# Patient Record
Sex: FEMALE | Race: WHITE | NOT HISPANIC OR LATINO | Employment: OTHER | URBAN - NONMETROPOLITAN AREA
[De-identification: names, ages, dates, MRNs, and addresses within clinical notes are randomized per-mention and may not be internally consistent; named-entity substitution may affect disease eponyms.]

---

## 2020-03-26 ENCOUNTER — OFFICE VISIT (OUTPATIENT)
Dept: ENDOCRINOLOGY | Facility: CLINIC | Age: 70
End: 2020-03-26

## 2020-03-26 VITALS
WEIGHT: 192.5 LBS | SYSTOLIC BLOOD PRESSURE: 140 MMHG | HEIGHT: 66 IN | OXYGEN SATURATION: 98 % | BODY MASS INDEX: 30.94 KG/M2 | DIASTOLIC BLOOD PRESSURE: 75 MMHG | HEART RATE: 91 BPM

## 2020-03-26 DIAGNOSIS — E78.49 OTHER HYPERLIPIDEMIA: ICD-10-CM

## 2020-03-26 DIAGNOSIS — Z79.4 TYPE 2 DIABETES MELLITUS WITH HYPERGLYCEMIA, WITH LONG-TERM CURRENT USE OF INSULIN (HCC): Primary | ICD-10-CM

## 2020-03-26 DIAGNOSIS — I10 ESSENTIAL HYPERTENSION: ICD-10-CM

## 2020-03-26 DIAGNOSIS — E11.65 TYPE 2 DIABETES MELLITUS WITH HYPERGLYCEMIA, WITH LONG-TERM CURRENT USE OF INSULIN (HCC): Primary | ICD-10-CM

## 2020-03-26 LAB — HBA1C MFR BLD: 8.8 %

## 2020-03-26 PROCEDURE — 99204 OFFICE O/P NEW MOD 45 MIN: CPT | Performed by: NURSE PRACTITIONER

## 2020-03-26 PROCEDURE — 95250 CONT GLUC MNTR PHYS/QHP EQP: CPT | Performed by: NURSE PRACTITIONER

## 2020-03-26 RX ORDER — HYDROCODONE BITARTRATE AND ACETAMINOPHEN 7.5; 325 MG/1; MG/1
1 TABLET ORAL EVERY 6 HOURS PRN
COMMUNITY

## 2020-03-26 RX ORDER — LISINOPRIL 20 MG/1
20 TABLET ORAL DAILY
Status: ON HOLD | COMMUNITY
End: 2023-02-10

## 2020-03-26 RX ORDER — GABAPENTIN 600 MG/1
1800 TABLET ORAL NIGHTLY
COMMUNITY

## 2020-03-26 RX ORDER — AMLODIPINE BESYLATE 2.5 MG/1
2.5 TABLET ORAL DAILY
COMMUNITY
End: 2020-07-09

## 2020-03-26 RX ORDER — RANITIDINE 300 MG/1
150 TABLET ORAL 2 TIMES DAILY
COMMUNITY
End: 2020-07-09

## 2020-03-26 NOTE — PROGRESS NOTES
Lizbeth Hurd is a 72 y.o.. female seen by diabetes educator on 03/26/2020 for review of medications and carbohydrate counting education.     1. Carbohydrate Counting/ Exchange Choices and Healthy Foods   I. Reviewed carbohydrate-containing foods, standard serving sizes, how to measure foods, and reading nutrition labels.   II. Provided patient with carbohydrate counting booklet (Carb counting and meal planning book).   III. Provided patient with list of non-starchy vegetables and foods that are low in carbohydrate for snacks and to incorporate with meals.     IV. Instructed patient to eat 45 grams of carbohydrate with each meal (3 exchange choices) and 15 grams of carbohydrate for snacks (1 exchange choices).    V. Reviewed the difference between simple and complex carbohydrate. Encouraged patient to choose complex carbohydrates more often.     VI. Choose fruits, vegetables, whole grains, legumes, low-fat milk, fiber-rich foods, minimal saturated fats, and watch cholesterol and sodium intake.    VII. Patient demonstrated understanding by correctly identifying and calculating carbohydrate amounts for various meals.    2. Humalog Dosing   I. 3 units of Humalog for every 15 grams of carbohydrate eaten plus 2 units per 50 mg/dl above 150 sliding scale   II. Patient demonstrated understanding by calculating correct dosage for example meals and blood sugars    3. Toujeo   I. 45 units daily, same time of day.     Follow up per JACOB Brannon.  Vonnie Blackwood, BSN, RN, [facility]  Diabetes Educator

## 2020-03-26 NOTE — PATIENT INSTRUCTIONS
oujeo 70  units once at night  -- decrease to 45 units       If am sugar is less than 80 decrease by 5 units         Start Humalog as carb counting       3 units for every 15 grams of CHO       Plus sliding scale       2 per 50 above 150       Aim for 45 grams of carbohydrate per meal     Aim for 15 grams of carbohydrate per snack     Download calorie ki for your phone

## 2020-03-26 NOTE — PROGRESS NOTES
Subjective    HPI       Referring provider Ventura Emanuel MD     72 year old female presents consultation     REASON - Diabetes Mellitus           Duration/Timing - diagnosed at least 12 years ago / constant       Quality - not controlled       Severity -  Moderate       Severity (Complication/Hospitalizations)          Secondary Macrovascular -- no CAD, no CVA, no PAD         Secondary Microvascular -- neuropathy, no diabetic retinopathy, no renal disease     History of left renal cancer        Context --  Presented for increased thirst, urination and labs revealed diabetes           Diabetes Regimen - oral medications, insulin         Lab Results   Component Value Date    HGBA1C 8.8 12/19/2019           Blood Glucose Readings    Checking 3 times daily       Running around 200     Diet-          Regular diet     Drinking chocolate milk      Associated Signs/Symptoms       Hyperglycemic Symptoms: increased thirst        Hypoglycemic Episodes: none      Review of Systems  Review of Systems   Constitutional: Negative for activity change, appetite change, chills, diaphoresis, fatigue, unexpected weight gain and unexpected weight loss.   HENT: Negative for congestion, dental problem, drooling, ear discharge, sore throat, swollen glands, tinnitus, trouble swallowing and voice change.    Eyes: Negative for blurred vision, double vision, photophobia, pain, discharge, redness, itching and visual disturbance.   Respiratory: Negative for apnea, cough, choking, chest tightness and shortness of breath.    Cardiovascular: Negative for chest pain, palpitations and leg swelling.   Gastrointestinal: Negative for abdominal distention, abdominal pain, blood in stool, constipation, diarrhea, nausea and vomiting.   Endocrine: Negative for cold intolerance, heat intolerance, polydipsia, polyphagia and polyuria.   Genitourinary: Negative for decreased libido, decreased urine volume, difficulty urinating, dysuria, frequency and urgency.    Musculoskeletal: Negative for arthralgias, back pain, gait problem, joint swelling, myalgias, neck pain, neck stiffness and bursitis.   Skin: Negative for color change, dry skin, pallor, rash and bruise.   Allergic/Immunologic: Negative for environmental allergies, food allergies and immunocompromised state.   Neurological: Negative for dizziness, tremors, syncope, facial asymmetry, weakness, numbness, memory problem and confusion.   Hematological: Negative for adenopathy. Does not bruise/bleed easily.   Psychiatric/Behavioral: Negative for agitation, behavioral problems, decreased concentration, sleep disturbance, suicidal ideas, depressed mood and stress. The patient is not nervous/anxious.        Wt Readings from Last 3 Encounters:   03/26/20 87.3 kg (192 lb 8 oz)     Temp Readings from Last 3 Encounters:   No data found for Temp     BP Readings from Last 3 Encounters:   03/26/20 140/75     Pulse Readings from Last 3 Encounters:   03/26/20 91       Physical Exam  Physical Exam   Constitutional: She is oriented to person, place, and time. She appears well-developed and well-nourished. No distress.   HENT:   Head: Normocephalic and atraumatic.   Right Ear: External ear normal.   Left Ear: External ear normal.   Nose: Nose normal.   Eyes: Pupils are equal, round, and reactive to light. Conjunctivae and EOM are normal.   Neck: Normal range of motion. Neck supple. No tracheal deviation present. No thyromegaly present.   Cardiovascular: Normal rate, regular rhythm and normal heart sounds.   No murmur heard.  Pulmonary/Chest: Effort normal and breath sounds normal. No respiratory distress. She has no wheezes.   Abdominal: Soft. Bowel sounds are normal. There is no tenderness. There is no rebound and no guarding.   Musculoskeletal: Normal range of motion. She exhibits no edema, tenderness or deformity.   Neurological: She is alert and oriented to person, place, and time. No cranial nerve deficit.   Skin: Skin is warm  and dry. No rash noted.   Psychiatric: She has a normal mood and affect. Her behavior is normal. Judgment and thought content normal.         Assessment/Plan     Diagnosis Plan   1. Type 2 diabetes mellitus with hyperglycemia, with long-term current use of insulin (CMS/[facility])     2. Other hyperlipidemia     3. Essential hypertension             Glycemic Management    Diabetes mellitus type 2         Lab Results   Component Value Date    HGBA1C 8.8 12/19/2019       Metformin 850 mg one po BID ---stopped due side effects       amaryl 2 mg BID --- -stopped when starting insulin     Humalog sliding scale -stop             Toujeo 70  units once at night  -- decrease to 45 units       If am sugar is less than 80 decrease by 5 units         Start Humalog as carb counting       3 units for every 15 grams of CHO       Plus sliding scale       2 per 50 above 150       Aim for 45 grams of carbohydrate per meal     Aim for 15 grams of carbohydrate per snack       Uncontrolled diabetes  Hyperglycemia    Insertion of continuous glucose monitor to define patter    The continuous glucose monitor that was inserted is a mesfin glucose monitor    Meet with Vonnie for diabetes education         Lipid Management    Dec. 2019      Total chol - 161   Tg - 162   HDL - 38  LDL - 91     On niacin     Blood Pressure Management        Lisinopril 20 mg daily    norvasc 5 mg daily               Microvascular Complication Monitoring    Last eye exam Sept 2019 , no DR     Neuropathy     Taking gapapentin       Foot exam  Next appt     Bone [employer]      .last        Other Diabetes Related Aspects          Thyroid Health        No results found for: TSH        Return in about 2 weeks (around 4/9/2020) for Recheck.       Records from  received and reviewed from [id number]  Thank you for this consultation             This document has been electronically signed by JACOB Shaw on March 26, 2020 14:33

## 2020-04-09 ENCOUNTER — OFFICE VISIT (OUTPATIENT)
Dept: ENDOCRINOLOGY | Facility: CLINIC | Age: 70
End: 2020-04-09

## 2020-04-09 DIAGNOSIS — Z79.4 TYPE 2 DIABETES MELLITUS WITH HYPERGLYCEMIA, WITH LONG-TERM CURRENT USE OF INSULIN (HCC): Primary | ICD-10-CM

## 2020-04-09 DIAGNOSIS — E11.65 TYPE 2 DIABETES MELLITUS WITH HYPERGLYCEMIA, WITH LONG-TERM CURRENT USE OF INSULIN (HCC): Primary | ICD-10-CM

## 2020-04-09 DIAGNOSIS — E78.49 OTHER HYPERLIPIDEMIA: ICD-10-CM

## 2020-04-09 DIAGNOSIS — I10 ESSENTIAL HYPERTENSION: ICD-10-CM

## 2020-04-09 PROCEDURE — 99214 OFFICE O/P EST MOD 30 MIN: CPT | Performed by: NURSE PRACTITIONER

## 2020-04-09 NOTE — PROGRESS NOTES
Macy Hurd is a 69 y.o. female. she is here for follow up     History of Present Illness         This is a telephone visit due to pandemic and patient is following the CDC guidelines for social distancing     Referring provider Ventura Emanuel MD      72 year old female presents for follow up      REASON - Diabetes Mellitus               Duration/Timing - diagnosed at least 12 years ago / constant         Quality  Improving control         Severity -  Moderate         Severity (Complication/Hospitalizations)           Secondary Macrovascular -- no CAD, no CVA, no PAD         Secondary Microvascular -- neuropathy, no diabetic retinopathy, no renal disease      History of left renal cancer          Context --  Presented for increased thirst, urination and labs revealed diabetes             Diabetes Regimen - oral medications, insulin                  Lab Results   Component Value Date     HGBA1C 8.8 12/19/2019               Blood Glucose Readings     Checking 3 times daily      Now running 135 and less     Lunch time 160 up to 180      No lows      Diet-            Regular diet      Drinking chocolate milk        Associated Signs/Symptoms       Hyperglycemic Symptoms: increased thirst        Hypoglycemic Episodes: none               The following portions of the patient's history were reviewed and updated as appropriate:   Past Medical History:   Diagnosis Date   • Diabetes (CMS/HCC)      Past Surgical History:   Procedure Laterality Date   • APPENDECTOMY     • BACK SURGERY     • CHOLECYSTECTOMY     • HYSTERECTOMY     • TONSILLECTOMY       Family History   Problem Relation Age of Onset   • Diabetes Other    • Cancer Other      OB History    None       Current Outpatient Medications   Medication Sig Dispense Refill   • amLODIPine (NORVASC) 2.5 MG tablet Take 2.5 mg by mouth Daily.     • gabapentin (NEURONTIN) 600 MG tablet Take 3,600 mg by mouth Every Night.     • HYDROcodone-acetaminophen (NORCO)  7.5-325 MG per tablet Take 1 tablet by mouth Every 6 (Six) Hours As Needed for Moderate Pain .     • Insulin Glargine, 1 Unit Dial, (TOUJEO) 300 UNIT/ML solution pen-injector injection Inject 70 Units under the skin into the appropriate area as directed Every Night.     • insulin lispro (humaLOG) 100 UNIT/ML injection Inject  under the skin into the appropriate area as directed 3 (Three) Times a Day Before Meals.     • lisinopril (PRINIVIL,ZESTRIL) 20 MG tablet Take 20 mg by mouth Daily.     • raNITIdine (ZANTAC) 300 MG tablet Take 150 mg by mouth 2 (Two) Times a Day.       No current facility-administered medications for this visit.      Allergies not on file  Social History     Socioeconomic History   • Marital status:      Spouse name: Not on file   • Number of children: Not on file   • Years of education: Not on file   • Highest education level: Not on file   Tobacco Use   • Smoking status: Never Smoker   • Smokeless tobacco: Never Used       Review of Systems  Review of Systems   Constitutional: Negative for activity change, appetite change, diaphoresis and fatigue.   HENT: Negative for facial swelling, sneezing, sore throat, tinnitus, trouble swallowing and voice change.    Eyes: Negative for photophobia, pain, discharge, redness, itching and visual disturbance.   Respiratory: Negative for apnea, cough, choking, chest tightness and shortness of breath.    Cardiovascular: Negative for chest pain, palpitations and leg swelling.   Gastrointestinal: Negative for abdominal distention, abdominal pain, constipation, diarrhea, nausea and vomiting.   Endocrine: Negative for cold intolerance, heat intolerance, polydipsia, polyphagia and polyuria.   Genitourinary: Negative for difficulty urinating, dysuria, frequency, hematuria and urgency.   Musculoskeletal: Negative for arthralgias, back pain, gait problem, joint swelling, myalgias, neck pain and neck stiffness.   Skin: Negative for color change, pallor, rash  and wound.   Neurological: Negative for dizziness, tremors, weakness, light-headedness, numbness and headaches.   Hematological: Negative for adenopathy. Does not bruise/bleed easily.   Psychiatric/Behavioral: Negative for behavioral problems, confusion and sleep disturbance.        Objective    There were no vitals taken for this visit.  Physical Exam   Constitutional: She is oriented to person, place, and time.   Neurological: She is alert and oriented to person, place, and time.   Psychiatric: She has a normal mood and affect. Her behavior is normal. Judgment and thought content normal.       Lab Review  Hemoglobin A1C (no units)   Date Value   12/19/2019 8.8       Assessment/Plan      1. Type 2 diabetes mellitus with hyperglycemia, with long-term current use of insulin (CMS/[facility])    2. Other hyperlipidemia    3. Essential hypertension    .    Medications prescribed:  Outpatient Encounter Medications as of 4/9/2020   Medication Sig Dispense Refill   • amLODIPine (NORVASC) 2.5 MG tablet Take 2.5 mg by mouth Daily.     • gabapentin (NEURONTIN) 600 MG tablet Take 3,600 mg by mouth Every Night.     • HYDROcodone-acetaminophen (NORCO) 7.5-325 MG per tablet Take 1 tablet by mouth Every 6 (Six) Hours As Needed for Moderate Pain .     • Insulin Glargine, 1 Unit Dial, (TOUJEO) 300 UNIT/ML solution pen-injector injection Inject 70 Units under the skin into the appropriate area as directed Every Night.     • insulin lispro (humaLOG) 100 UNIT/ML injection Inject  under the skin into the appropriate area as directed 3 (Three) Times a Day Before Meals.     • lisinopril (PRINIVIL,ZESTRIL) 20 MG tablet Take 20 mg by mouth Daily.     • raNITIdine (ZANTAC) 300 MG tablet Take 150 mg by mouth 2 (Two) Times a Day.       No facility-administered encounter medications on file as of 4/9/2020.        Orders placed during this encounter include:  No orders of the defined types were placed in this encounter.    Glycemic Management      Diabetes mellitus type 2                  Lab Results   Component Value Date     HGBA1C 8.8 12/19/2019         Metformin 850 mg one po BID ---stopped due side effects         amaryl 2 mg BID --- -stopped when starting insulin      Humalog sliding scale -stop             Current regimen :          Toujeo  45 units         If am sugar is less than 80 decrease by 5 units             Humalog as carb counting         3 units for every 15 grams of CHO         Plus sliding scale         2 per 50 above 150         Aim for 45 grams of carbohydrate per meal      Aim for 15 grams of carbohydrate per snack          she will mail the mesfin back to the office             Lipid Management     Dec. 2019        Total chol - 161   Tg - 162   HDL - 38  LDL - 91      On niacin      Blood Pressure Management           Lisinopril 20 mg daily     norvasc 5 mg daily                     Microvascular Complication Monitoring     Last eye exam Sept 2019 , no DR      Neuropathy      Taking gapapentin         Foot exam  Next appt      Bone [employer]        .last           Other Diabetes Related Aspects              Thyroid Health         Weight management    Loss 4 lbs since last visit , counting carbs         Total time for telehealth visit 22 minutes of phone conversation  ( there was also time for preparation, ordering labs, medications, etc.)  Patient instructed to call for problems with medications, blood sugars         4. Follow-up: Return in about 3 months (around 7/9/2020) for Recheck.

## 2020-04-15 NOTE — PROGRESS NOTES
.You have chosen to receive care through a telephone visit. Do you consent to use a telephone visit for your medical care today? Yes

## 2020-07-09 ENCOUNTER — OFFICE VISIT (OUTPATIENT)
Dept: ENDOCRINOLOGY | Facility: CLINIC | Age: 70
End: 2020-07-09

## 2020-07-09 VITALS
DIASTOLIC BLOOD PRESSURE: 80 MMHG | HEART RATE: 95 BPM | HEIGHT: 66 IN | WEIGHT: 190.2 LBS | SYSTOLIC BLOOD PRESSURE: 166 MMHG | OXYGEN SATURATION: 98 % | BODY MASS INDEX: 30.57 KG/M2

## 2020-07-09 DIAGNOSIS — I10 ESSENTIAL HYPERTENSION: ICD-10-CM

## 2020-07-09 DIAGNOSIS — Z79.4 TYPE 2 DIABETES MELLITUS WITH HYPERGLYCEMIA, WITH LONG-TERM CURRENT USE OF INSULIN (HCC): Primary | ICD-10-CM

## 2020-07-09 DIAGNOSIS — E11.65 TYPE 2 DIABETES MELLITUS WITH HYPERGLYCEMIA, WITH LONG-TERM CURRENT USE OF INSULIN (HCC): Primary | ICD-10-CM

## 2020-07-09 DIAGNOSIS — E78.49 OTHER HYPERLIPIDEMIA: ICD-10-CM

## 2020-07-09 LAB — HBA1C MFR BLD: 5.6 %

## 2020-07-09 PROCEDURE — 99214 OFFICE O/P EST MOD 30 MIN: CPT | Performed by: NURSE PRACTITIONER

## 2020-07-09 RX ORDER — CYCLOBENZAPRINE HCL 5 MG
5 TABLET ORAL 2 TIMES DAILY PRN
COMMUNITY
Start: 2020-07-02

## 2020-07-09 RX ORDER — FAMOTIDINE 20 MG/1
TABLET, FILM COATED ORAL
Status: ON HOLD | COMMUNITY
Start: 2020-07-02 | End: 2023-02-10

## 2020-07-09 RX ORDER — FLURBIPROFEN SODIUM 0.3 MG/ML
SOLUTION/ DROPS OPHTHALMIC
COMMUNITY
Start: 2020-07-01 | End: 2021-06-11 | Stop reason: SDUPTHER

## 2020-07-09 RX ORDER — AMLODIPINE BESYLATE 5 MG/1
TABLET ORAL
COMMUNITY
Start: 2020-07-02 | End: 2022-04-14

## 2020-07-09 NOTE — PROGRESS NOTES
Subjective    Lizbeth Hurd is a 70 y.o. female. she is here today for follow-up.    History of Present Illness     IN OFFICE VISIT     Referring provider Ventura Emanuel MD      70 year old female presents for follow up      REASON - Diabetes Mellitus               Duration/Timing - diagnosed at least 12 years ago / constant         Quality  Improving control         Severity -  Moderate         Severity (Complication/Hospitalizations)           Secondary Macrovascular -- no CAD, no CVA, no PAD         Secondary Microvascular -- neuropathy, no diabetic retinopathy, no renal disease      History of left renal cancer          Context --  Presented for increased thirst, urination and labs revealed diabetes             Diabetes Regimen - oral medications, insulin                 Lab Results   Component Value Date    HGBA1C 5.6 06/15/2020             Blood Glucose Readings     Checking 3 times daily      This am was 107    Am runs from 90 up to 170      No lows      Diet-            Regular diet      Drinking chocolate milk        Associated Signs/Symptoms       Hyperglycemic Symptoms: increased thirst        Hypoglycemic Episodes: none                    The following portions of the patient's history were reviewed and updated as appropriate:   Past Medical History:   Diagnosis Date   • Diabetes (CMS/HCC)      Past Surgical History:   Procedure Laterality Date   • APPENDECTOMY     • BACK SURGERY     • CHOLECYSTECTOMY     • HYSTERECTOMY     • TONSILLECTOMY       Family History   Problem Relation Age of Onset   • Diabetes Other    • Cancer Other      OB History    None       Current Outpatient Medications   Medication Sig Dispense Refill   • amLODIPine (NORVASC) 2.5 MG tablet Take 2.5 mg by mouth Daily.     • amLODIPine (NORVASC) 5 MG tablet      • B-D UF III MINI PEN NEEDLES 31G X 5 MM misc      • cyclobenzaprine (FLEXERIL) 5 MG tablet      • famotidine (PEPCID) 20 MG tablet      • gabapentin (NEURONTIN) 600 MG tablet  Take 3,600 mg by mouth Every Night.     • HYDROcodone-acetaminophen (NORCO) 7.5-325 MG per tablet Take 1 tablet by mouth Every 6 (Six) Hours As Needed for Moderate Pain .     • Insulin Glargine, 1 Unit Dial, (TOUJEO) 300 UNIT/ML solution pen-injector injection Inject 70 Units under the skin into the appropriate area as directed Every Night.     • insulin lispro (humaLOG) 100 UNIT/ML injection Inject  under the skin into the appropriate area as directed 3 (Three) Times a Day Before Meals.     • lisinopril (PRINIVIL,ZESTRIL) 20 MG tablet Take 20 mg by mouth Daily.     • raNITIdine (ZANTAC) 300 MG tablet Take 150 mg by mouth 2 (Two) Times a Day.       No current facility-administered medications for this visit.      No Known Allergies  Social History     Socioeconomic History   • Marital status:      Spouse name: Not on file   • Number of children: Not on file   • Years of education: Not on file   • Highest education level: Not on file   Tobacco Use   • Smoking status: Never Smoker   • Smokeless tobacco: Never Used       Review of Systems  Review of Systems   Constitutional: Negative for activity change, appetite change, diaphoresis and fatigue.   HENT: Negative for facial swelling, sneezing, sore throat, tinnitus, trouble swallowing and voice change.    Eyes: Negative for photophobia, pain, discharge, redness, itching and visual disturbance.   Respiratory: Negative for apnea, cough, choking, chest tightness and shortness of breath.    Cardiovascular: Negative for chest pain, palpitations and leg swelling.   Gastrointestinal: Negative for abdominal distention, abdominal pain, constipation, diarrhea, nausea and vomiting.   Endocrine: Negative for cold intolerance, heat intolerance, polydipsia, polyphagia and polyuria.   Genitourinary: Negative for difficulty urinating, dysuria, frequency, hematuria and urgency.   Musculoskeletal: Negative for arthralgias, back pain, gait problem, joint swelling, myalgias, neck  "pain and neck stiffness.   Skin: Negative for color change, pallor, rash and wound.   Neurological: Negative for dizziness, tremors, weakness, light-headedness, numbness and headaches.   Hematological: Negative for adenopathy. Does not bruise/bleed easily.   Psychiatric/Behavioral: Negative for behavioral problems, confusion and sleep disturbance.        Objective    /80   Pulse 95   Ht 167.6 cm (66\")   Wt 86.3 kg (190 lb 3.2 oz)   SpO2 98%   BMI 30.70 kg/m²   Physical Exam   Constitutional: She is oriented to person, place, and time. She appears well-developed and well-nourished. No distress.   HENT:   Head: Normocephalic and atraumatic.   Right Ear: External ear normal.   Left Ear: External ear normal.   Nose: Nose normal.   Eyes: Pupils are equal, round, and reactive to light. Conjunctivae and EOM are normal.   Neck: Normal range of motion. Neck supple. No tracheal deviation present. No thyromegaly present.   Cardiovascular: Normal rate, regular rhythm and normal heart sounds.   No murmur heard.  Pulmonary/Chest: Effort normal and breath sounds normal. No respiratory distress. She has no wheezes.   Abdominal: Soft. Bowel sounds are normal. There is no tenderness. There is no rebound and no guarding.   Musculoskeletal: Normal range of motion. She exhibits no edema, tenderness or deformity.    Lizbeth had a diabetic foot exam performed today.   During the foot exam she had a monofilament test performed.    Neurological Sensory Findings -  No right ankle/foot altered proprioception and no left ankle/foot altered proprioception  Vascular Status -  Her right foot exhibits no edema. Her left foot exhibits no edema.  Skin Integrity  -  She has right heel is dry and cracked.She has left heel dry and cracked..  Neurological: She is alert and oriented to person, place, and time. No cranial nerve deficit.   Skin: Skin is warm and dry. No rash noted.   Psychiatric: She has a normal mood and affect. Her behavior is " normal. Judgment and thought content normal.       Lab Review  Hemoglobin A1C (no units)   Date Value   06/15/2020 5.6   12/19/2019 8.8       Assessment/Plan      1. Type 2 diabetes mellitus with hyperglycemia, with long-term current use of insulin (CMS/[facility])    2. Other hyperlipidemia    3. Essential hypertension    .    Medications prescribed:  Outpatient Encounter Medications as of 7/9/2020   Medication Sig Dispense Refill   • amLODIPine (NORVASC) 2.5 MG tablet Take 2.5 mg by mouth Daily.     • amLODIPine (NORVASC) 5 MG tablet      • B-D UF III MINI PEN NEEDLES 31G X 5 MM misc      • cyclobenzaprine (FLEXERIL) 5 MG tablet      • famotidine (PEPCID) 20 MG tablet      • gabapentin (NEURONTIN) 600 MG tablet Take 3,600 mg by mouth Every Night.     • HYDROcodone-acetaminophen (NORCO) 7.5-325 MG per tablet Take 1 tablet by mouth Every 6 (Six) Hours As Needed for Moderate Pain .     • Insulin Glargine, 1 Unit Dial, (TOUJEO) 300 UNIT/ML solution pen-injector injection Inject 70 Units under the skin into the appropriate area as directed Every Night.     • insulin lispro (humaLOG) 100 UNIT/ML injection Inject  under the skin into the appropriate area as directed 3 (Three) Times a Day Before Meals.     • lisinopril (PRINIVIL,ZESTRIL) 20 MG tablet Take 20 mg by mouth Daily.     • raNITIdine (ZANTAC) 300 MG tablet Take 150 mg by mouth 2 (Two) Times a Day.       No facility-administered encounter medications on file as of 7/9/2020.        Orders placed during this encounter include:  Orders Placed This Encounter   Procedures   • Hemoglobin A1c     This order was created through External Result Entry   Glycemic Management     Diabetes mellitus type 2         Lab Results   Component Value Date    HGBA1C 5.6 06/15/2020             Metformin 850 mg one po BID ---stopped due side effects         amaryl 2 mg BID --- -stopped when starting insulin      Humalog sliding scale -stop             Current regimen :            Toujeo  45  units         If am sugar is less than 80 decrease by 5 units             Humalog as carb counting         3 units for every 15 grams of CHO for each meal TID         Plus sliding scale         2 per 50 above 150         Aim for 45 grams of carbohydrate per meal      Aim for 15 grams of carbohydrate per snack                    Lipid Management     June 2020     Total chl 145  Tg - 144    HDL - 34  LDL - 81      On niacin      Blood Pressure Management           Lisinopril 20 mg daily     norvasc 5 mg daily                     Microvascular Complication Monitoring     Last eye exam Sept 2019 , no DR      Neuropathy      Taking gapapentin         Foot exam  done today      Bone Health        .last           Other Diabetes Related Aspects              Thyroid Health         Weight management     Patient's Body mass index is 30.7 kg/m². BMI is above normal parameters. Recommendations include: nutrition counseling.            4. Follow-up: Return in about 3 months (around 10/9/2020) for Recheck.

## 2020-10-12 ENCOUNTER — OFFICE VISIT (OUTPATIENT)
Dept: ENDOCRINOLOGY | Facility: CLINIC | Age: 70
End: 2020-10-12

## 2020-10-12 VITALS
SYSTOLIC BLOOD PRESSURE: 158 MMHG | OXYGEN SATURATION: 97 % | BODY MASS INDEX: 31.64 KG/M2 | WEIGHT: 196.9 LBS | HEART RATE: 106 BPM | DIASTOLIC BLOOD PRESSURE: 72 MMHG | HEIGHT: 66 IN

## 2020-10-12 DIAGNOSIS — E11.65 TYPE 2 DIABETES MELLITUS WITH HYPERGLYCEMIA, WITH LONG-TERM CURRENT USE OF INSULIN (HCC): Primary | ICD-10-CM

## 2020-10-12 DIAGNOSIS — Z79.4 TYPE 2 DIABETES MELLITUS WITH HYPERGLYCEMIA, WITH LONG-TERM CURRENT USE OF INSULIN (HCC): Primary | ICD-10-CM

## 2020-10-12 DIAGNOSIS — E78.49 OTHER HYPERLIPIDEMIA: ICD-10-CM

## 2020-10-12 DIAGNOSIS — E11.42 DIABETIC POLYNEUROPATHY ASSOCIATED WITH TYPE 2 DIABETES MELLITUS (HCC): ICD-10-CM

## 2020-10-12 DIAGNOSIS — I10 ESSENTIAL HYPERTENSION: ICD-10-CM

## 2020-10-12 LAB — HBA1C MFR BLD: 6 %

## 2020-10-12 PROCEDURE — 99214 OFFICE O/P EST MOD 30 MIN: CPT | Performed by: NURSE PRACTITIONER

## 2020-10-12 NOTE — PROGRESS NOTES
Subjective    Lizbeth Hurd is a 70 y.o. female. she is here today for follow-up.    History of Present Illness         IN OFFICE VISIT      Referring provider Ventura Emanuel MD      70 year old female presents for follow up      REASON - Diabetes Mellitus               Duration/Timing - diagnosed at least 12 years ago / constant         Quality  Improving control         Severity -  Moderate         Severity (Complication/Hospitalizations)           Secondary Macrovascular -- no CAD, no CVA, no PAD         Secondary Microvascular -- neuropathy, no diabetic retinopathy, no renal disease      History of left renal cancer          Context --  Presented for increased thirst, urination and labs revealed diabetes             Diabetes Regimen - oral medications, insulin               Lab Results   Component Value Date    HGBA1C 6 09/16/2020             Blood Glucose Readings     Checking 3 times daily      This am was 107     Am runs from 90 up to 140     No lows      Diet-            Regular diet      Drinking chocolate milk        Associated Signs/Symptoms       Hyperglycemic Symptoms: increased thirst        Hypoglycemic Episodes: none            The following portions of the patient's history were reviewed and updated as appropriate:   Past Medical History:   Diagnosis Date   • Diabetes (CMS/HCC)      Past Surgical History:   Procedure Laterality Date   • APPENDECTOMY     • BACK SURGERY     • CHOLECYSTECTOMY     • HYSTERECTOMY     • TONSILLECTOMY       Family History   Problem Relation Age of Onset   • Diabetes Other    • Cancer Other      OB History    No obstetric history on file.       Current Outpatient Medications   Medication Sig Dispense Refill   • amLODIPine (NORVASC) 5 MG tablet      • B-D UF III MINI PEN NEEDLES 31G X 5 MM misc      • cyclobenzaprine (FLEXERIL) 5 MG tablet      • famotidine (PEPCID) 20 MG tablet      • gabapentin (NEURONTIN) 600 MG tablet Take 3,600 mg by mouth Every Night.     •  HYDROcodone-acetaminophen (NORCO) 7.5-325 MG per tablet Take 1 tablet by mouth Every 6 (Six) Hours As Needed for Moderate Pain .     • Insulin Glargine, 1 Unit Dial, (TOUJEO) 300 UNIT/ML solution pen-injector injection Inject 70 Units under the skin into the appropriate area as directed Every Night.     • insulin lispro (humaLOG) 100 UNIT/ML injection Inject  under the skin into the appropriate area as directed 3 (Three) Times a Day Before Meals.     • lisinopril (PRINIVIL,ZESTRIL) 20 MG tablet Take 20 mg by mouth Daily.       No current facility-administered medications for this visit.      No Known Allergies  Social History     Socioeconomic History   • Marital status:      Spouse name: Not on file   • Number of children: Not on file   • Years of education: Not on file   • Highest education level: Not on file   Tobacco Use   • Smoking status: Never Smoker   • Smokeless tobacco: Never Used       Review of Systems  Review of Systems   Constitutional: Negative for activity change, appetite change, diaphoresis and fatigue.   HENT: Negative for facial swelling, sneezing, sore throat, tinnitus, trouble swallowing and voice change.    Eyes: Negative for photophobia, pain, discharge, redness, itching and visual disturbance.   Respiratory: Negative for apnea, cough, choking, chest tightness and shortness of breath.    Cardiovascular: Negative for chest pain, palpitations and leg swelling.   Gastrointestinal: Negative for abdominal distention, abdominal pain, constipation, diarrhea, nausea and vomiting.   Endocrine: Negative for cold intolerance, heat intolerance, polydipsia, polyphagia and polyuria.   Genitourinary: Negative for difficulty urinating, dysuria, frequency, hematuria and urgency.   Musculoskeletal: Negative for arthralgias, back pain, gait problem, joint swelling, myalgias, neck pain and neck stiffness.   Skin: Negative for color change, pallor, rash and wound.   Neurological: Negative for dizziness,  "tremors, weakness, light-headedness, numbness and headaches.   Hematological: Negative for adenopathy. Does not bruise/bleed easily.   Psychiatric/Behavioral: Negative for behavioral problems, confusion and sleep disturbance.        Objective    /72   Pulse 106   Ht 167.6 cm (66\")   Wt 89.3 kg (196 lb 14.4 oz)   SpO2 97%   BMI 31.78 kg/m²   Physical Exam  Constitutional:       General: She is not in acute distress.     Appearance: She is well-developed.   HENT:      Head: Normocephalic and atraumatic.      Right Ear: External ear normal.      Left Ear: External ear normal.      Nose: Nose normal.   Eyes:      Conjunctiva/sclera: Conjunctivae normal.      Pupils: Pupils are equal, round, and reactive to light.   Neck:      Musculoskeletal: Normal range of motion and neck supple.      Thyroid: No thyromegaly.      Trachea: No tracheal deviation.   Cardiovascular:      Rate and Rhythm: Normal rate and regular rhythm.      Heart sounds: Normal heart sounds. No murmur.   Pulmonary:      Effort: Pulmonary effort is normal. No respiratory distress.      Breath sounds: Normal breath sounds. No wheezing.   Abdominal:      General: Bowel sounds are normal.      Palpations: Abdomen is soft.      Tenderness: There is no abdominal tenderness. There is no guarding or rebound.   Musculoskeletal: Normal range of motion.         General: No tenderness or deformity.   Skin:     General: Skin is warm and dry.      Findings: No rash.   Neurological:      Mental Status: She is alert and oriented to person, place, and time.      Cranial Nerves: No cranial nerve deficit.   Psychiatric:         Behavior: Behavior normal.         Thought Content: Thought content normal.         Judgment: Judgment normal.         Lab Review  Hemoglobin A1C (no units)   Date Value   09/16/2020 6   06/15/2020 5.6   12/19/2019 8.8       Assessment/Plan      1. Type 2 diabetes mellitus with hyperglycemia, with long-term current use of insulin " (CMS/[facility])    2. Other hyperlipidemia    3. Essential hypertension    .    Medications prescribed:  Outpatient Encounter Medications as of 10/12/2020   Medication Sig Dispense Refill   • amLODIPine (NORVASC) 5 MG tablet      • B-D UF III MINI PEN NEEDLES 31G X 5 MM misc      • cyclobenzaprine (FLEXERIL) 5 MG tablet      • famotidine (PEPCID) 20 MG tablet      • gabapentin (NEURONTIN) 600 MG tablet Take 3,600 mg by mouth Every Night.     • HYDROcodone-acetaminophen (NORCO) 7.5-325 MG per tablet Take 1 tablet by mouth Every 6 (Six) Hours As Needed for Moderate Pain .     • Insulin Glargine, 1 Unit Dial, (TOUJEO) 300 UNIT/ML solution pen-injector injection Inject 70 Units under the skin into the appropriate area as directed Every Night.     • insulin lispro (humaLOG) 100 UNIT/ML injection Inject  under the skin into the appropriate area as directed 3 (Three) Times a Day Before Meals.     • lisinopril (PRINIVIL,ZESTRIL) 20 MG tablet Take 20 mg by mouth Daily.       No facility-administered encounter medications on file as of 10/12/2020.        Orders placed during this encounter include:  Orders Placed This Encounter   Procedures   • Hemoglobin A1c     This order was created through External Result Entry     Glycemic Management     Diabetes mellitus type 2         Lab Results   Component Value Date    HGBA1C 6 09/16/2020             Metformin 850 mg one po BID ---stopped due side effects         amaryl 2 mg BID --- -stopped when starting insulin      Humalog sliding scale -stop             Current regimen :            Toujeo  45 units         If am sugar is less than 80 decrease by 5 units             Humalog as carb counting         3 units for every 15 grams of CHO for each meal TID         Plus sliding scale         2 per 50 above 150         Aim for 45 grams of carbohydrate per meal      Aim for 15 grams of carbohydrate per snack                     Lipid Management     Sept 2020      Total chl 151  Tg - 130     HDL  - 43  LDL - 82     On niacin      Blood Pressure Management           Lisinopril 20 mg daily     norvasc 5 mg daily                     Microvascular Complication Monitoring     Last eye exam Sept 2019 , no DR , postponed due to pandemic      Neuropathy      Taking gapapentin         Foot exam  up to date      Bone Health        .last           Other Diabetes Related Aspects              Thyroid Health         Weight management     Patient's Body mass index is 31.78 kg/m². BMI is above normal parameters. Recommendations include: nutrition counseling.      4. Follow-up: No follow-ups on file.

## 2021-02-12 ENCOUNTER — TELEMEDICINE (OUTPATIENT)
Dept: ENDOCRINOLOGY | Facility: CLINIC | Age: 71
End: 2021-02-12

## 2021-02-12 DIAGNOSIS — Z79.4 TYPE 2 DIABETES MELLITUS WITH DIABETIC POLYNEUROPATHY, WITH LONG-TERM CURRENT USE OF INSULIN (HCC): Primary | ICD-10-CM

## 2021-02-12 DIAGNOSIS — E78.2 MIXED HYPERLIPIDEMIA: ICD-10-CM

## 2021-02-12 DIAGNOSIS — I10 ESSENTIAL HYPERTENSION: ICD-10-CM

## 2021-02-12 DIAGNOSIS — E11.42 TYPE 2 DIABETES MELLITUS WITH DIABETIC POLYNEUROPATHY, WITH LONG-TERM CURRENT USE OF INSULIN (HCC): Primary | ICD-10-CM

## 2021-02-12 DIAGNOSIS — E55.9 VITAMIN D DEFICIENCY: ICD-10-CM

## 2021-02-12 LAB — HBA1C MFR BLD: 6.7 %

## 2021-02-12 PROCEDURE — 99214 OFFICE O/P EST MOD 30 MIN: CPT | Performed by: NURSE PRACTITIONER

## 2021-02-12 NOTE — PROGRESS NOTES
Chief Complaint  Diabetes    Subjective          Lizbeth Hurd presents to [facility] ENDOCR[facility]OLOG[facility]  History of Present Illness     You have chosen to receive care through a telehealth visit.  Do you consent to use a video/audio connection for your medical care today? Yes            TELEHEALTH VIDEO VISIT     This a video visit due to [facility] current guidelines for social distancing due to the COVID 19 pandemic          Referring provider Ventura Emanuel MD       70 year old female presents for follow up     Reason diabetes mellitus     Duration diagnosed 2008     Timing constant      Quality near control     Severity moderate                 Severity (Complication/Hospitalizations)           Secondary Macrovascular --none         Secondary Microvascular -- neuropathy     History of left renal cancer          Context --  Presented for increased thirst, urination and labs revealed diabetes             Diabetes Regimen - oral medications, insulin         Lab Results   Component Value Date    HGBA1C 6.7 12/18/2020             Blood Glucose Readings     Checking 3 times daily        Am below 130    States daytime up to 200      Diet-         Regular diet         Associated Signs/Symptoms       Hyperglycemic Symptoms: none        Hypoglycemic Episodes: none                  Objective   Vital Signs:   There were no vitals taken for this visit.    Physical Exam  Neurological:      General: No focal deficit present.      Mental Status: She is alert.   Psychiatric:         Mood and Affect: Mood normal.         Thought Content: Thought content normal.         Judgment: Judgment normal.        Result Review :                 Assessment and Plan    Diagnoses and all orders for this visit:    1. Type 2 diabetes mellitus with diabetic polyneuropathy, with long-term current use of insulin (CMS/[facility]) (Primary)    2. Vitamin D deficiency    3. Essential hypertension    4. Mixed hyperlipidemia           Glycemic  Management     Diabetes mellitus type 2         Lab Results   Component Value Date    HGBA1C 6.7 12/18/2020          Metformin 850 mg one po BID ---stopped due side effects         amaryl 2 mg BID --- -stopped when starting insulin      Humalog sliding scale -stop             Current regimen :            Taking Toujeo  45 units once night         If am sugar is less than 80 decrease by 5 units             Humalog as carb counting         3 units for every 15 grams of CHO for each meal TID         Plus sliding scale         2 per 50 above 150         Aim for 45 grams of carbohydrate per meal      Aim for 15 grams of carbohydrate per snack                     Lipid Management     Sept 2020      Total chl 151  Tg - 130     HDL - 43  LDL - 82     Taking niacin      Blood Pressure Management           Taking Lisinopril 20 mg daily     Taking norvasc 5 mg daily                     Microvascular Complication Monitoring     Last eye exam Sept 2019 , no DR , postponed due to pandemic      Neuropathy      Taking gapapentin              Bone Health        Vitamin d def.    Taking vitamin d supplement            Other Diabetes Related Aspects              Thyroid Health         Weight management     Overweight              Follow Up   No follow-ups on file.  Patient was given instructions and counseling regarding her condition or for health maintenance advice. Please see specific information pulled into the AVS if appropriate.     We spent 25 minutes including reviewing the patients electronic chart, reviewing medications, reviewing labs, active problems    I provided advice regarding diabetes management, dietary changes, adjustments of medication, self titration of insulin, refilled medications, ordering labs, future appointments    Patient was advised to contact the office if there are unanswered questions, trouble with blood glucose management, or any concerns

## 2021-06-11 ENCOUNTER — OFFICE VISIT (OUTPATIENT)
Dept: ENDOCRINOLOGY | Facility: CLINIC | Age: 71
End: 2021-06-11

## 2021-06-11 VITALS
WEIGHT: 197.8 LBS | SYSTOLIC BLOOD PRESSURE: 164 MMHG | OXYGEN SATURATION: 98 % | DIASTOLIC BLOOD PRESSURE: 82 MMHG | HEIGHT: 66 IN | HEART RATE: 96 BPM | BODY MASS INDEX: 31.79 KG/M2

## 2021-06-11 DIAGNOSIS — E11.42 TYPE 2 DIABETES MELLITUS WITH DIABETIC POLYNEUROPATHY, WITH LONG-TERM CURRENT USE OF INSULIN (HCC): Primary | ICD-10-CM

## 2021-06-11 DIAGNOSIS — E78.2 MIXED HYPERLIPIDEMIA: ICD-10-CM

## 2021-06-11 DIAGNOSIS — Z79.4 TYPE 2 DIABETES MELLITUS WITH DIABETIC POLYNEUROPATHY, WITH LONG-TERM CURRENT USE OF INSULIN (HCC): Primary | ICD-10-CM

## 2021-06-11 DIAGNOSIS — I10 ESSENTIAL HYPERTENSION: ICD-10-CM

## 2021-06-11 DIAGNOSIS — E55.9 VITAMIN D DEFICIENCY: ICD-10-CM

## 2021-06-11 LAB — HBA1C MFR BLD: 7.3 %

## 2021-06-11 PROCEDURE — 99214 OFFICE O/P EST MOD 30 MIN: CPT | Performed by: NURSE PRACTITIONER

## 2021-06-11 RX ORDER — LANCETS 30 GAUGE
EACH MISCELLANEOUS
Qty: 120 EACH | Refills: 11 | Status: SHIPPED | OUTPATIENT
Start: 2021-06-11 | End: 2022-06-20

## 2021-06-11 RX ORDER — FLURBIPROFEN SODIUM 0.3 MG/ML
SOLUTION/ DROPS OPHTHALMIC
Qty: 180 EACH | Refills: 11 | Status: SHIPPED | OUTPATIENT
Start: 2021-06-11 | End: 2022-06-20

## 2021-06-11 NOTE — PROGRESS NOTES
"Chief Complaint  Diabetes    Subjective          Lizbeth Hurd presents to [facility] ENDOCRINOLOGY  History of Present Illness      In office visit     Referring provider Ventura Emanuel MD     71 year old female presents for follow up     Reason diabetes mellitus type 2    Timing constant     Duration diagnosed in 2008     Quality near control           Severity moderate        Severity (Complication/Hospitalizations)           Secondary Macrovascular --none         Secondary Microvascular -- neuropathy     History of left renal cancer          Context --  Presented for increased thirst, urination and labs revealed diabetes             Diabetes Regimen - oral medications, insulin         Lab Results   Component Value Date    HGBA1C 7.3 03/17/2021             Blood Glucose Readings     Checking 4 times daily      Am readings 180 and up     Eating ice cream before bed          Diet-         Regular diet         Associated Signs/Symptoms       Hyperglycemic Symptoms: none        Hypoglycemic Episodes: none               Review of Systems - General ROS: negative        Objective   Vital Signs:   /82   Pulse 96   Ht 167.6 cm (66\")   Wt 89.7 kg (197 lb 12.8 oz)   SpO2 98%   BMI 31.93 kg/m²     Physical Exam  Constitutional:       Appearance: Normal appearance.   Cardiovascular:      Rate and Rhythm: Regular rhythm.      Heart sounds: Normal heart sounds.   Pulmonary:      Breath sounds: Normal breath sounds.   Musculoskeletal:         General: Normal range of motion.      Cervical back: Normal range of motion.   Skin:     Coloration: Skin is not pale.   Neurological:      General: No focal deficit present.      Mental Status: She is alert.   Psychiatric:         Mood and Affect: Mood normal.         Thought Content: Thought content normal.         Judgment: Judgment normal.        Result Review :   The following data was reviewed by: JACOB Shaw on 06/11/2021:  Common labs  "   Common Labsle 9/16/20 12/18/20 3/17/21   Hemoglobin A1C 6 6.7 7.3                     Assessment and Plan    Diagnoses and all orders for this visit:    1. Type 2 diabetes mellitus with diabetic polyneuropathy, with long-term current use of insulin (CMS/[facility]) (Primary)  -     CBC & Differential; Future  -     Comprehensive Metabolic Panel; Future  -     Hemoglobin A1c; Future  -     Lipid Panel; Future  -     Microalbumin / Creatinine Urine Ratio - Urine, Clean Catch; Future  -     Protein / Creatinine Ratio, Urine - Urine, Clean Catch; Future  -     TSH; Future  -     Vitamin B12; Future  -     Vitamin D 25 Hydroxy; Future    2. Mixed hyperlipidemia  -     CBC & Differential; Future  -     Comprehensive Metabolic Panel; Future  -     Hemoglobin A1c; Future  -     Lipid Panel; Future  -     Microalbumin / Creatinine Urine Ratio - Urine, Clean Catch; Future  -     Protein / Creatinine Ratio, Urine - Urine, Clean Catch; Future  -     TSH; Future  -     Vitamin B12; Future  -     Vitamin D 25 Hydroxy; Future    3. Essential hypertension  -     CBC & Differential; Future  -     Comprehensive Metabolic Panel; Future  -     Hemoglobin A1c; Future  -     Lipid Panel; Future  -     Microalbumin / Creatinine Urine Ratio - Urine, Clean Catch; Future  -     Protein / Creatinine Ratio, Urine - Urine, Clean Catch; Future  -     TSH; Future  -     Vitamin B12; Future  -     Vitamin D 25 Hydroxy; Future    4. Vitamin D deficiency  -     CBC & Differential; Future  -     Comprehensive Metabolic Panel; Future  -     Hemoglobin A1c; Future  -     Lipid Panel; Future  -     Microalbumin / Creatinine Urine Ratio - Urine, Clean Catch; Future  -     Protein / Creatinine Ratio, Urine - Urine, Clean Catch; Future  -     TSH; Future  -     Vitamin B12; Future  -     Vitamin D 25 Hydroxy; Future    Other orders  -     insulin lispro (humaLOG) 100 UNIT/ML injection; Inject 9 up to 15 units 4 times daily before each meal  Dispense: 100 mL;  Refill: 11  -     B-D UF III MINI PEN NEEDLES 31G X 5 MM misc; Inject 6 times daily,E10.65  Dispense: 180 each; Refill: 11  -     Insulin Glargine, 1 Unit Dial, (TOUJEO) 300 UNIT/ML solution pen-injector injection; Inject 70 Units under the skin into the appropriate area as directed Every Night.  Dispense: 7 pen; Refill: 11  -     glucose blood test strip; Test 4 times daily, E11.9  Dispense: 120 each; Refill: 11  -     Lancets misc; Test 4 times daily, E11.9  Dispense: 120 each; Refill: 11             Glycemic Management     Diabetes mellitus type 2         Lab Results   Component Value Date    HGBA1C 7.3 03/17/2021       Metformin 850 mg one po BID ---stopped due side effects         amaryl 2 mg BID --- -stopped when starting insulin      Humalog sliding scale -stop             Current regimen :            Taking Toujeo  45 units once night         If am sugar is less than 80 decrease by 5 units             Humalog as carb counting         3 units for every 15 grams of CHO for each meal TID         Plus sliding scale         2 per 50 above 150       No changes in regimen     Please give Humalog for ice cream         Aim for 45 grams of carbohydrate per meal      Aim for 15 grams of carbohydrate per snack                     Lipid Management     Sept 2020      Total chl 151  Tg - 130     HDL - 43  LDL - 82     Taking niacin      Blood Pressure Management           Taking Lisinopril 20 mg daily     Taking norvasc 5 mg daily                     Microvascular Complication Monitoring     Last eye exam Sept 2019 , no DR , postponed due to pandemic      Neuropathy      Taking gapapentin               Bone Health        Vitamin d def.     Taking vitamin d supplement            Other Diabetes Related Aspects              Thyroid Health         Weight management     Overweight     Patient's Body mass index is 31.93 kg/m². indicating that she is obese (BMI >30). Obesity-related health conditions include the following:  diabetes mellitus. Obesity is unchanged. BMI is is above average; no BMI management plan is appropriate. We discussed portion control and increasing exercise..           Follow Up   Return in about 4 months (around 10/11/2021) for Recheck.  Patient was given instructions and counseling regarding her condition or for health maintenance advice. Please see specific information pulled into the AVS if appropriate.

## 2021-06-15 ENCOUNTER — TELEPHONE (OUTPATIENT)
Dept: ENDOCRINOLOGY | Facility: CLINIC | Age: 71
End: 2021-06-15

## 2021-06-15 NOTE — TELEPHONE ENCOUNTER
Pt left a vm stating that she is confused about her labs, she says the paper our office gave her says to go around September 6th. Her next appt is October 11th, and Dick told her a week before.

## 2021-10-04 ENCOUNTER — LAB (OUTPATIENT)
Dept: LAB | Facility: HOSPITAL | Age: 71
End: 2021-10-04

## 2021-10-04 DIAGNOSIS — I10 ESSENTIAL HYPERTENSION: ICD-10-CM

## 2021-10-04 DIAGNOSIS — E11.42 TYPE 2 DIABETES MELLITUS WITH DIABETIC POLYNEUROPATHY, WITH LONG-TERM CURRENT USE OF INSULIN (HCC): ICD-10-CM

## 2021-10-04 DIAGNOSIS — E55.9 VITAMIN D DEFICIENCY: ICD-10-CM

## 2021-10-04 DIAGNOSIS — E78.2 MIXED HYPERLIPIDEMIA: ICD-10-CM

## 2021-10-04 DIAGNOSIS — Z79.4 TYPE 2 DIABETES MELLITUS WITH DIABETIC POLYNEUROPATHY, WITH LONG-TERM CURRENT USE OF INSULIN (HCC): ICD-10-CM

## 2021-10-04 LAB
25(OH)D3 SERPL-MCNC: 22.1 NG/ML
ALBUMIN SERPL-MCNC: 4.5 G/DL (ref 3.5–5.2)
ALBUMIN UR-MCNC: 4.3 MG/DL
ALBUMIN/GLOB SERPL: 1.3 G/DL
ALP SERPL-CCNC: 63 U/L (ref 39–117)
ALT SERPL W P-5'-P-CCNC: 33 U/L (ref 1–33)
ANION GAP SERPL CALCULATED.3IONS-SCNC: 9 MMOL/L (ref 5–15)
AST SERPL-CCNC: 35 U/L (ref 1–32)
BASOPHILS # BLD AUTO: 0.09 10*3/MM3 (ref 0–0.2)
BASOPHILS NFR BLD AUTO: 1 % (ref 0–1.5)
BILIRUB SERPL-MCNC: 1.2 MG/DL (ref 0–1.2)
BUN SERPL-MCNC: 15 MG/DL (ref 8–23)
BUN/CREAT SERPL: 20.8 (ref 7–25)
CALCIUM SPEC-SCNC: 9.4 MG/DL (ref 8.6–10.5)
CHLORIDE SERPL-SCNC: 102 MMOL/L (ref 98–107)
CHOLEST SERPL-MCNC: 110 MG/DL (ref 0–200)
CO2 SERPL-SCNC: 25 MMOL/L (ref 22–29)
CREAT SERPL-MCNC: 0.72 MG/DL (ref 0.57–1)
CREAT UR-MCNC: 90.3 MG/DL
CREAT UR-MCNC: 90.3 MG/DL
DEPRECATED RDW RBC AUTO: 45.2 FL (ref 37–54)
EOSINOPHIL # BLD AUTO: 0.16 10*3/MM3 (ref 0–0.4)
EOSINOPHIL NFR BLD AUTO: 1.8 % (ref 0.3–6.2)
ERYTHROCYTE [DISTWIDTH] IN BLOOD BY AUTOMATED COUNT: 13.5 % (ref 12.3–15.4)
GFR SERPL CREATININE-BSD FRML MDRD: 80 ML/MIN/1.73
GFR SERPL CREATININE-BSD FRML MDRD: 97 ML/MIN/1.73
GLOBULIN UR ELPH-MCNC: 3.5 GM/DL
GLUCOSE SERPL-MCNC: 318 MG/DL (ref 65–99)
HBA1C MFR BLD: 8.33 % (ref 4.8–5.6)
HCT VFR BLD AUTO: 41.3 % (ref 34–46.6)
HDLC SERPL-MCNC: 33 MG/DL (ref 40–60)
HGB BLD-MCNC: 13.8 G/DL (ref 12–15.9)
IMM GRANULOCYTES # BLD AUTO: 0.11 10*3/MM3 (ref 0–0.05)
IMM GRANULOCYTES NFR BLD AUTO: 1.2 % (ref 0–0.5)
LDLC SERPL CALC-MCNC: 53 MG/DL (ref 0–100)
LDLC/HDLC SERPL: 1.51 {RATIO}
LYMPHOCYTES # BLD AUTO: 2.73 10*3/MM3 (ref 0.7–3.1)
LYMPHOCYTES NFR BLD AUTO: 30.5 % (ref 19.6–45.3)
MCH RBC QN AUTO: 30.5 PG (ref 26.6–33)
MCHC RBC AUTO-ENTMCNC: 33.4 G/DL (ref 31.5–35.7)
MCV RBC AUTO: 91.4 FL (ref 79–97)
MICROALBUMIN/CREAT UR: 47.6 MG/G
MONOCYTES # BLD AUTO: 0.6 10*3/MM3 (ref 0.1–0.9)
MONOCYTES NFR BLD AUTO: 6.7 % (ref 5–12)
NEUTROPHILS NFR BLD AUTO: 5.27 10*3/MM3 (ref 1.7–7)
NEUTROPHILS NFR BLD AUTO: 58.8 % (ref 42.7–76)
NRBC BLD AUTO-RTO: 0 /100 WBC (ref 0–0.2)
PLATELET # BLD AUTO: 158 10*3/MM3 (ref 140–450)
PMV BLD AUTO: 12.8 FL (ref 6–12)
POTASSIUM SERPL-SCNC: 4.4 MMOL/L (ref 3.5–5.2)
PROT SERPL-MCNC: 8 G/DL (ref 6–8.5)
PROT UR-MCNC: 18 MG/DL
PROT/CREAT UR: 199.3 MG/G CREA (ref 0–200)
RBC # BLD AUTO: 4.52 10*6/MM3 (ref 3.77–5.28)
SODIUM SERPL-SCNC: 136 MMOL/L (ref 136–145)
TRIGL SERPL-MCNC: 136 MG/DL (ref 0–150)
TSH SERPL DL<=0.05 MIU/L-ACNC: 1.48 UIU/ML (ref 0.27–4.2)
VIT B12 BLD-MCNC: 781 PG/ML (ref 211–946)
VLDLC SERPL-MCNC: 24 MG/DL (ref 5–40)
WBC # BLD AUTO: 8.96 10*3/MM3 (ref 3.4–10.8)

## 2021-10-04 PROCEDURE — 83036 HEMOGLOBIN GLYCOSYLATED A1C: CPT

## 2021-10-04 PROCEDURE — 82306 VITAMIN D 25 HYDROXY: CPT

## 2021-10-04 PROCEDURE — 84443 ASSAY THYROID STIM HORMONE: CPT

## 2021-10-04 PROCEDURE — 82607 VITAMIN B-12: CPT

## 2021-10-04 PROCEDURE — 84156 ASSAY OF PROTEIN URINE: CPT

## 2021-10-04 PROCEDURE — 85025 COMPLETE CBC W/AUTO DIFF WBC: CPT

## 2021-10-04 PROCEDURE — 82570 ASSAY OF URINE CREATININE: CPT

## 2021-10-04 PROCEDURE — 82043 UR ALBUMIN QUANTITATIVE: CPT

## 2021-10-04 PROCEDURE — 80061 LIPID PANEL: CPT

## 2021-10-04 PROCEDURE — 80053 COMPREHEN METABOLIC PANEL: CPT

## 2021-10-11 ENCOUNTER — OFFICE VISIT (OUTPATIENT)
Dept: ENDOCRINOLOGY | Facility: CLINIC | Age: 71
End: 2021-10-11

## 2021-10-11 VITALS
WEIGHT: 198.3 LBS | HEART RATE: 117 BPM | OXYGEN SATURATION: 95 % | HEIGHT: 66 IN | BODY MASS INDEX: 31.87 KG/M2 | SYSTOLIC BLOOD PRESSURE: 156 MMHG | DIASTOLIC BLOOD PRESSURE: 86 MMHG

## 2021-10-11 DIAGNOSIS — I10 ESSENTIAL HYPERTENSION: ICD-10-CM

## 2021-10-11 DIAGNOSIS — Z79.4 TYPE 2 DIABETES MELLITUS WITH DIABETIC POLYNEUROPATHY, WITH LONG-TERM CURRENT USE OF INSULIN (HCC): Primary | ICD-10-CM

## 2021-10-11 DIAGNOSIS — Z79.4 TYPE 2 DIABETES MELLITUS WITH HYPERGLYCEMIA, WITH LONG-TERM CURRENT USE OF INSULIN (HCC): ICD-10-CM

## 2021-10-11 DIAGNOSIS — E11.65 TYPE 2 DIABETES MELLITUS WITH HYPERGLYCEMIA, WITH LONG-TERM CURRENT USE OF INSULIN (HCC): ICD-10-CM

## 2021-10-11 DIAGNOSIS — E55.9 VITAMIN D DEFICIENCY: ICD-10-CM

## 2021-10-11 DIAGNOSIS — E11.42 TYPE 2 DIABETES MELLITUS WITH DIABETIC POLYNEUROPATHY, WITH LONG-TERM CURRENT USE OF INSULIN (HCC): Primary | ICD-10-CM

## 2021-10-11 DIAGNOSIS — E78.2 MIXED HYPERLIPIDEMIA: ICD-10-CM

## 2021-10-11 PROCEDURE — 99214 OFFICE O/P EST MOD 30 MIN: CPT | Performed by: NURSE PRACTITIONER

## 2021-10-11 RX ORDER — ATORVASTATIN CALCIUM 10 MG/1
10 TABLET, FILM COATED ORAL DAILY
COMMUNITY

## 2021-10-11 NOTE — PROGRESS NOTES
"Chief Complaint  Diabetes    Subjective          Lizbeth Hurd presents to [facility] ENDOCRINOLOGY  History of Present Illness       In office visit    Primary provider Ventura Emanuel MD     71 year old female presents for follow up     Reason diabetes mellitus type 2     Duration diagnosed in 2008     Timing constant     Quality not controlled    Lab Results   Component Value Date    HGBA1C 8.33 (H) 10/04/2021         Severity moderate      She has lost control with diet                Macrovascular --none        Microvascular -- neuropathy     History of left renal cancer          Context --  Presented for increased thirst, urination and labs revealed diabetes             Diabetes Regimen - oral medications, insulin              Blood Glucose Readings     Checking 4 times daily     Lost of control    Eating higher carb     States 200 and higher                   Review of Systems - General ROS: negative              Objective   Vital Signs:   /86   Pulse 117   Ht 167.6 cm (66\")   Wt 89.9 kg (198 lb 4.8 oz)   SpO2 95%   BMI 32.01 kg/m²     Physical Exam  Constitutional:       Appearance: Normal appearance.   Cardiovascular:      Rate and Rhythm: Regular rhythm.      Heart sounds: Normal heart sounds.   Pulmonary:      Breath sounds: Normal breath sounds.   Musculoskeletal:      Cervical back: Normal range of motion.   Neurological:      Mental Status: She is alert.        Result Review :   The following data was reviewed by: JACOB Shaw on 10/11/2021:  Common labs    Common Labsle 12/18/20 3/17/21 10/4/21 10/4/21 10/4/21 10/4/21 10/4/21      1141 1141 1141 1141 1141   Glucose     318 (A)     BUN     15     Creatinine     0.72     eGFR Non African Am     80     eGFR African Am     97     Sodium     136     Potassium     4.4     Chloride     102     Calcium     9.4     Albumin     4.50     Total Bilirubin     1.2     Alkaline Phosphatase     63     AST (SGOT)     " 35 (A)     ALT (SGPT)     33     WBC   8.96       Hemoglobin   13.8       Hematocrit   41.3       Platelets   158       Total Cholesterol    110      Triglycerides    136      HDL Cholesterol    33 (A)      LDL Cholesterol     53      Hemoglobin A1C 6.7 7.3    8.33 (A)    Microalbumin, Urine       4.3   (A) Abnormal value                      Assessment and Plan    Diagnoses and all orders for this visit:    1. Type 2 diabetes mellitus with diabetic polyneuropathy, with long-term current use of insulin (HCC) (Primary)  -     CBC & Differential; Future  -     Comprehensive Metabolic Panel; Future  -     Hemoglobin A1c; Future  -     Lipid Panel; Future  -     Microalbumin / Creatinine Urine Ratio - Urine, Clean Catch; Future  -     Protein / Creatinine Ratio, Urine - Urine, Clean Catch; Future  -     TSH; Future  -     Vitamin B12; Future  -     Vitamin D 25 Hydroxy; Future    2. Mixed hyperlipidemia  -     CBC & Differential; Future  -     Comprehensive Metabolic Panel; Future  -     Hemoglobin A1c; Future  -     Lipid Panel; Future  -     Microalbumin / Creatinine Urine Ratio - Urine, Clean Catch; Future  -     Protein / Creatinine Ratio, Urine - Urine, Clean Catch; Future  -     TSH; Future  -     Vitamin B12; Future  -     Vitamin D 25 Hydroxy; Future    3. Essential hypertension  -     CBC & Differential; Future  -     Comprehensive Metabolic Panel; Future  -     Hemoglobin A1c; Future  -     Lipid Panel; Future  -     Microalbumin / Creatinine Urine Ratio - Urine, Clean Catch; Future  -     Protein / Creatinine Ratio, Urine - Urine, Clean Catch; Future  -     TSH; Future  -     Vitamin B12; Future  -     Vitamin D 25 Hydroxy; Future    4. Vitamin D deficiency  -     CBC & Differential; Future  -     Comprehensive Metabolic Panel; Future  -     Hemoglobin A1c; Future  -     Lipid Panel; Future  -     Microalbumin / Creatinine Urine Ratio - Urine, Clean Catch; Future  -     Protein / Creatinine Ratio, Urine -  Urine, Clean Catch; Future  -     TSH; Future  -     Vitamin B12; Future  -     Vitamin D 25 Hydroxy; Future    5. Type 2 diabetes mellitus with hyperglycemia, with long-term current use of insulin (HCC)  -     CBC & Differential; Future  -     Comprehensive Metabolic Panel; Future  -     Hemoglobin A1c; Future  -     Lipid Panel; Future  -     Microalbumin / Creatinine Urine Ratio - Urine, Clean Catch; Future  -     Protein / Creatinine Ratio, Urine - Urine, Clean Catch; Future  -     TSH; Future  -     Vitamin B12; Future  -     Vitamin D 25 Hydroxy; Future    Other orders  -     insulin lispro (humaLOG) 100 UNIT/ML injection; Inject 15 up to 30 units 4 times daily before each meal  Dispense: 150 mL; Refill: 11           Glycemic Management     Diabetes mellitus type 2               Lab Results   Component Value Date     HGBA1C 7.3 03/17/2021         Metformin 850 mg one po BID ---stopped due side effects         amaryl 2 mg BID --- -stopped when starting insulin      Humalog sliding scale -stop             Current regimen :            Taking Toujeo  45 units once night --increase to 50 untis         If am sugar is less than 80 decrease by 5 units             Humalog as carb counting         3 units for every 15 grams of CHO for each meal TID ---increase to 4 per 15 grams of CHO         Plus sliding scale         2 per 50 above 150           Please give Humalog for ice cream         Aim for 45 grams of carbohydrate per meal      Aim for 15 grams of carbohydrate per snack                     Lipid Management    Hyperlipidemia      Taking lipitor 10 mg daily      Taking niacin       Total Cholesterol   Date Value Ref Range Status   10/04/2021 110 0 - 200 mg/dL Final     Triglycerides   Date Value Ref Range Status   10/04/2021 136 0 - 150 mg/dL Final     HDL Cholesterol   Date Value Ref Range Status   10/04/2021 33 (L) 40 - 60 mg/dL Final     LDL Cholesterol    Date Value Ref Range Status   10/04/2021 53 0 - 100  mg/dL Final        Blood Pressure Management           Taking Lisinopril 20 mg daily     Taking norvasc 5 mg daily                     Microvascular Complication Monitoring     Last eye exam Sept 2019 , no DR , postponed due to pandemic      Neuropathy      Taking gapapentin               Bone Health        Vitamin d def.     Taking vitamin d supplement         Component      Latest Ref Rng & Units 10/4/2021   25 Hydroxy, Vitamin D      ng/ml 22.1        Other Diabetes Related Aspects           Lab Results   Component Value Date    [id number] 10/04/2021          Thyroid Health      Lab Results   Component Value Date    TSH 1.480 10/04/2021              Weight management     Overweight         Patient's Body mass index is 32.01 kg/m². indicating that she is obese (BMI >30). Obesity-related health conditions include the following: diabetes mellitus. Obesity is unchanged. BMI is is above average; no BMI management plan is appropriate. We discussed portion control and increasing exercise..               Follow Up   Return in about 3 months (around 1/11/2022) for Recheck.  Patient was given instructions and counseling regarding her condition or for health maintenance advice. Please see specific information pulled into the AVS if appropriate.         This document has been electronically signed by JACOB Shaw on October 11, 2021 11:26 CDT.

## 2022-01-05 ENCOUNTER — LAB (OUTPATIENT)
Dept: LAB | Facility: HOSPITAL | Age: 72
End: 2022-01-05

## 2022-01-05 DIAGNOSIS — I10 ESSENTIAL HYPERTENSION: ICD-10-CM

## 2022-01-05 DIAGNOSIS — Z79.4 TYPE 2 DIABETES MELLITUS WITH HYPERGLYCEMIA, WITH LONG-TERM CURRENT USE OF INSULIN: ICD-10-CM

## 2022-01-05 DIAGNOSIS — Z79.4 TYPE 2 DIABETES MELLITUS WITH DIABETIC POLYNEUROPATHY, WITH LONG-TERM CURRENT USE OF INSULIN: ICD-10-CM

## 2022-01-05 DIAGNOSIS — E55.9 VITAMIN D DEFICIENCY: ICD-10-CM

## 2022-01-05 DIAGNOSIS — E78.2 MIXED HYPERLIPIDEMIA: ICD-10-CM

## 2022-01-05 DIAGNOSIS — E11.42 TYPE 2 DIABETES MELLITUS WITH DIABETIC POLYNEUROPATHY, WITH LONG-TERM CURRENT USE OF INSULIN: ICD-10-CM

## 2022-01-05 DIAGNOSIS — E11.65 TYPE 2 DIABETES MELLITUS WITH HYPERGLYCEMIA, WITH LONG-TERM CURRENT USE OF INSULIN: ICD-10-CM

## 2022-01-05 LAB
25(OH)D3 SERPL-MCNC: 20.7 NG/ML
ALBUMIN SERPL-MCNC: 4.7 G/DL (ref 3.5–5.2)
ALBUMIN UR-MCNC: 6.5 MG/DL
ALBUMIN/GLOB SERPL: 1.5 G/DL
ALP SERPL-CCNC: 57 U/L (ref 39–117)
ALT SERPL W P-5'-P-CCNC: 39 U/L (ref 1–33)
ANION GAP SERPL CALCULATED.3IONS-SCNC: 6.8 MMOL/L (ref 5–15)
AST SERPL-CCNC: 38 U/L (ref 1–32)
BASOPHILS # BLD AUTO: 0.09 10*3/MM3 (ref 0–0.2)
BASOPHILS NFR BLD AUTO: 1.7 % (ref 0–1.5)
BILIRUB SERPL-MCNC: 0.8 MG/DL (ref 0–1.2)
BUN SERPL-MCNC: 13 MG/DL (ref 8–23)
BUN/CREAT SERPL: 19.1 (ref 7–25)
CALCIUM SPEC-SCNC: 9.7 MG/DL (ref 8.6–10.5)
CHLORIDE SERPL-SCNC: 103 MMOL/L (ref 98–107)
CHOLEST SERPL-MCNC: 100 MG/DL (ref 0–200)
CO2 SERPL-SCNC: 31.2 MMOL/L (ref 22–29)
CREAT SERPL-MCNC: 0.68 MG/DL (ref 0.57–1)
CREAT UR-MCNC: 235.3 MG/DL
CREAT UR-MCNC: 235.3 MG/DL
DEPRECATED RDW RBC AUTO: 43.8 FL (ref 37–54)
EOSINOPHIL # BLD AUTO: 0.13 10*3/MM3 (ref 0–0.4)
EOSINOPHIL NFR BLD AUTO: 2.4 % (ref 0.3–6.2)
ERYTHROCYTE [DISTWIDTH] IN BLOOD BY AUTOMATED COUNT: 13.8 % (ref 12.3–15.4)
GFR SERPL CREATININE-BSD FRML MDRD: 104 ML/MIN/1.73
GFR SERPL CREATININE-BSD FRML MDRD: 86 ML/MIN/1.73
GLOBULIN UR ELPH-MCNC: 3.2 GM/DL
GLUCOSE SERPL-MCNC: 127 MG/DL (ref 65–99)
HBA1C MFR BLD: 7.66 % (ref 4.8–5.6)
HCT VFR BLD AUTO: 37.3 % (ref 34–46.6)
HDLC SERPL-MCNC: 32 MG/DL (ref 40–60)
HGB BLD-MCNC: 13.3 G/DL (ref 12–15.9)
IMM GRANULOCYTES # BLD AUTO: 0.03 10*3/MM3 (ref 0–0.05)
IMM GRANULOCYTES NFR BLD AUTO: 0.6 % (ref 0–0.5)
LDLC SERPL CALC-MCNC: 49 MG/DL (ref 0–100)
LDLC/HDLC SERPL: 1.49 {RATIO}
LYMPHOCYTES # BLD AUTO: 2.09 10*3/MM3 (ref 0.7–3.1)
LYMPHOCYTES NFR BLD AUTO: 38.6 % (ref 19.6–45.3)
MCH RBC QN AUTO: 31.2 PG (ref 26.6–33)
MCHC RBC AUTO-ENTMCNC: 35.7 G/DL (ref 31.5–35.7)
MCV RBC AUTO: 87.6 FL (ref 79–97)
MICROALBUMIN/CREAT UR: 27.6 MG/G
MONOCYTES # BLD AUTO: 0.8 10*3/MM3 (ref 0.1–0.9)
MONOCYTES NFR BLD AUTO: 14.8 % (ref 5–12)
NEUTROPHILS NFR BLD AUTO: 2.27 10*3/MM3 (ref 1.7–7)
NEUTROPHILS NFR BLD AUTO: 41.9 % (ref 42.7–76)
NRBC BLD AUTO-RTO: 0 /100 WBC (ref 0–0.2)
PLATELET # BLD AUTO: 135 10*3/MM3 (ref 140–450)
PMV BLD AUTO: 13 FL (ref 6–12)
POTASSIUM SERPL-SCNC: 4.5 MMOL/L (ref 3.5–5.2)
PROT ?TM UR-MCNC: 34 MG/DL
PROT SERPL-MCNC: 7.9 G/DL (ref 6–8.5)
PROT/CREAT UR: 144.5 MG/G CREA (ref 0–200)
RBC # BLD AUTO: 4.26 10*6/MM3 (ref 3.77–5.28)
SODIUM SERPL-SCNC: 141 MMOL/L (ref 136–145)
TRIGL SERPL-MCNC: 101 MG/DL (ref 0–150)
TSH SERPL DL<=0.05 MIU/L-ACNC: 2.55 UIU/ML (ref 0.27–4.2)
VIT B12 BLD-MCNC: 664 PG/ML (ref 211–946)
VLDLC SERPL-MCNC: 19 MG/DL (ref 5–40)
WBC NRBC COR # BLD: 5.41 10*3/MM3 (ref 3.4–10.8)

## 2022-01-05 PROCEDURE — 84156 ASSAY OF PROTEIN URINE: CPT

## 2022-01-05 PROCEDURE — 82607 VITAMIN B-12: CPT

## 2022-01-05 PROCEDURE — 83036 HEMOGLOBIN GLYCOSYLATED A1C: CPT

## 2022-01-05 PROCEDURE — 80061 LIPID PANEL: CPT

## 2022-01-05 PROCEDURE — 85025 COMPLETE CBC W/AUTO DIFF WBC: CPT

## 2022-01-05 PROCEDURE — 80053 COMPREHEN METABOLIC PANEL: CPT

## 2022-01-05 PROCEDURE — 82570 ASSAY OF URINE CREATININE: CPT

## 2022-01-05 PROCEDURE — 82043 UR ALBUMIN QUANTITATIVE: CPT

## 2022-01-05 PROCEDURE — 82306 VITAMIN D 25 HYDROXY: CPT

## 2022-01-05 PROCEDURE — 84443 ASSAY THYROID STIM HORMONE: CPT

## 2022-01-12 ENCOUNTER — TELEMEDICINE (OUTPATIENT)
Dept: ENDOCRINOLOGY | Facility: CLINIC | Age: 72
End: 2022-01-12

## 2022-01-12 DIAGNOSIS — E78.2 MIXED HYPERLIPIDEMIA: ICD-10-CM

## 2022-01-12 DIAGNOSIS — E55.9 VITAMIN D DEFICIENCY: ICD-10-CM

## 2022-01-12 DIAGNOSIS — E11.42 DIABETIC POLYNEUROPATHY ASSOCIATED WITH TYPE 2 DIABETES MELLITUS: ICD-10-CM

## 2022-01-12 DIAGNOSIS — E11.65 TYPE 2 DIABETES MELLITUS WITH HYPERGLYCEMIA, WITH LONG-TERM CURRENT USE OF INSULIN: Primary | ICD-10-CM

## 2022-01-12 DIAGNOSIS — Z79.4 TYPE 2 DIABETES MELLITUS WITH HYPERGLYCEMIA, WITH LONG-TERM CURRENT USE OF INSULIN: Primary | ICD-10-CM

## 2022-01-12 DIAGNOSIS — I10 ESSENTIAL HYPERTENSION: ICD-10-CM

## 2022-01-12 PROCEDURE — 99214 OFFICE O/P EST MOD 30 MIN: CPT | Performed by: NURSE PRACTITIONER

## 2022-01-12 RX ORDER — DULOXETIN HYDROCHLORIDE 30 MG/1
30 CAPSULE, DELAYED RELEASE ORAL DAILY
Qty: 30 CAPSULE | Refills: 2 | Status: SHIPPED | OUTPATIENT
Start: 2022-01-12 | End: 2022-05-09

## 2022-01-12 NOTE — PROGRESS NOTES
Chief Complaint  Diabetes    Subjective          Lizbeth Hurd presents to [facility] ENDOCRINOLOGY  History of Present Illness    You have chosen to receive care through a telehealth visit.  Do you consent to use a video/audio connection for your medical care today? Yes          TELEHEALTH VIDEO VISIT     This a video visit due to [facility] current guidelines for social distancing due to the COVID 19 pandemic    Primary provider JACOB Osborne    71 year old female presents for follow up     Reason diabetes mellitus type 2    Diagnosed in 2008     Timing constant    Quality improved     Severity moderate    Lab Results   Component Value Date    HGBA1C 7.66 (H) 01/05/2022        Macrovascular --none       Microvascular -- neuropathy     History of left renal cancer          Context --  Presented for increased thirst, urination and labs revealed diabetes       Diabetes regimen     Oral medications, insulin             Blood Glucose Readings    Fingerstick's      Checking 3 times daily           States under 180 with meals    Waking up under 130       Review of Systems - General ROS: negative            Objective   Vital Signs:   There were no vitals taken for this visit.    Physical Exam  Neurological:      General: No focal deficit present.      Mental Status: She is alert.   Psychiatric:         Mood and Affect: Mood normal.         Thought Content: Thought content normal.         Judgment: Judgment normal.        Result Review :   The following data was reviewed by: JACOB Shaw on 01/12/2022:  Common labs    Common Labsle 3/17/21 10/4/21 10/4/21 10/4/21 10/4/21 10/4/21 1/5/22 1/5/22 1/5/22 1/5/22 1/5/22     1141 1141 1141 1141 1141 0945 0945 0945 0945 0945   Glucose    318 (A)    127 (A)      BUN    15    13      Creatinine    0.72    0.68      eGFR Non African Am    80    86      eGFR  Am    97    104      Sodium    136    141      Potassium    4.4    4.5       Chloride    102    103      Calcium    9.4    9.7      Albumin    4.50    4.70      Total Bilirubin    1.2    0.8      Alkaline Phosphatase    63    57      AST (SGOT)    35 (A)    38 (A)      ALT (SGPT)    33    39 (A)      WBC  8.96         5.41   Hemoglobin  13.8         13.3   Hematocrit  41.3         37.3   Platelets  158         135 (A)   Total Cholesterol   110      100     Triglycerides   136      101     HDL Cholesterol   33 (A)      32 (A)     LDL Cholesterol    53      49     Hemoglobin A1C 7.3    8.33 (A)  7.66 (A)       Microalbumin, Urine      4.3    6.5    (A) Abnormal value                      Assessment and Plan    Diagnoses and all orders for this visit:    1. Type 2 diabetes mellitus with hyperglycemia, with long-term current use of insulin (HCC) (Primary)  -     CBC & Differential; Future  -     Comprehensive Metabolic Panel; Future  -     Hemoglobin A1c; Future  -     Lipid Panel; Future  -     Microalbumin / Creatinine Urine Ratio - Urine, Clean Catch; Future  -     Protein / Creatinine Ratio, Urine - Urine, Clean Catch; Future  -     TSH; Future  -     Vitamin B12; Future  -     Vitamin D 25 Hydroxy; Future    2. Mixed hyperlipidemia  -     CBC & Differential; Future  -     Comprehensive Metabolic Panel; Future  -     Hemoglobin A1c; Future  -     Lipid Panel; Future  -     Microalbumin / Creatinine Urine Ratio - Urine, Clean Catch; Future  -     Protein / Creatinine Ratio, Urine - Urine, Clean Catch; Future  -     TSH; Future  -     Vitamin B12; Future  -     Vitamin D 25 Hydroxy; Future    3. Essential hypertension  -     CBC & Differential; Future  -     Comprehensive Metabolic Panel; Future  -     Hemoglobin A1c; Future  -     Lipid Panel; Future  -     Microalbumin / Creatinine Urine Ratio - Urine, Clean Catch; Future  -     Protein / Creatinine Ratio, Urine - Urine, Clean Catch; Future  -     TSH; Future  -     Vitamin B12; Future  -     Vitamin D 25 Hydroxy; Future    4. Diabetic  polyneuropathy associated with type 2 diabetes mellitus (HCC)  -     CBC & Differential; Future  -     Comprehensive Metabolic Panel; Future  -     Hemoglobin A1c; Future  -     Lipid Panel; Future  -     Microalbumin / Creatinine Urine Ratio - Urine, Clean Catch; Future  -     Protein / Creatinine Ratio, Urine - Urine, Clean Catch; Future  -     TSH; Future  -     Vitamin B12; Future  -     Vitamin D 25 Hydroxy; Future    5. Vitamin D deficiency  -     CBC & Differential; Future  -     Comprehensive Metabolic Panel; Future  -     Hemoglobin A1c; Future  -     Lipid Panel; Future  -     Microalbumin / Creatinine Urine Ratio - Urine, Clean Catch; Future  -     Protein / Creatinine Ratio, Urine - Urine, Clean Catch; Future  -     TSH; Future  -     Vitamin B12; Future  -     Vitamin D 25 Hydroxy; Future    Other orders  -     DULoxetine (Cymbalta) 30 MG capsule; Take 1 capsule by mouth Daily.  Dispense: 30 capsule; Refill: 2             Glycemic Management     Diabetes mellitus type 2      Lab Results   Component Value Date    HGBA1C 7.66 (H) 01/05/2022          Metformin 850 mg one po BID ---stopped due side effects         amaryl 2 mg BID --- -stopped when starting insulin      Humalog sliding scale -stop             Current regimen :            Taking Toujeo  45 units once night         If am sugar is less than 80 decrease by 5 units             Humalog as carb counting         3 units for every 15 grams of CHO for each meal TID         Plus sliding scale         2 per 50 above 150         Aim for 45 grams of carbohydrate per meal      Aim for 15 grams of carbohydrate per snack             no changes today         Lipid Management            Taking niacin         Total Cholesterol   Date Value Ref Range Status   01/05/2022 100 0 - 200 mg/dL Final     Triglycerides   Date Value Ref Range Status   01/05/2022 101 0 - 150 mg/dL Final     HDL Cholesterol   Date Value Ref Range Status   01/05/2022 32 (L) 40 - 60 mg/dL  Final     LDL Cholesterol    Date Value Ref Range Status   01/05/2022 49 0 - 100 mg/dL Final        Blood Pressure Management           Taking Lisinopril 20 mg daily     Taking norvasc 5 mg daily                     Microvascular Complication Monitoring     Last eye exam Sept 2019 , no DR , postponed due to pandemic      Neuropathy      Taking gapapentin            Component      Latest Ref Rng & Units 1/5/2022 1/5/2022           9:45 AM  9:45 AM   Microalbumin/Creatinine Ratio      mg/g 27.6    Creatinine, Urine      mg/dL 235.3 235.3   Microalbumin, Urine      mg/dL 6.5    Protein/Creatinine Ratio      0.0 - 200.0 mg/G Crea  144.5   Total Protein, Urine      mg/dL  34.0            Start cymbalta 30 mg one daily         Bone Health        Vitamin d def.     Taking vitamin d supplement         Component      Latest Ref Rng & Units 1/5/2022   25 Hydroxy, Vitamin D      ng/ml 20.7        Other Diabetes Related Aspects              Thyroid Health      Lab Results   Component Value Date    TSH 2.550 01/05/2022          Lab Results   Component Value Date    [id number] 664 01/05/2022           Weight management     Overweight            Follow Up   No follow-ups on file.  Patient was given instructions and counseling regarding her condition or for health maintenance advice. Please see specific information pulled into the AVS if appropriate.         This document has been electronically signed by JACOB Shaw on January 12, 2022 11:22 CST.

## 2022-04-07 ENCOUNTER — LAB (OUTPATIENT)
Dept: LAB | Facility: HOSPITAL | Age: 72
End: 2022-04-07

## 2022-04-07 DIAGNOSIS — E55.9 VITAMIN D DEFICIENCY: ICD-10-CM

## 2022-04-07 DIAGNOSIS — Z79.4 TYPE 2 DIABETES MELLITUS WITH HYPERGLYCEMIA, WITH LONG-TERM CURRENT USE OF INSULIN: ICD-10-CM

## 2022-04-07 DIAGNOSIS — E78.2 MIXED HYPERLIPIDEMIA: ICD-10-CM

## 2022-04-07 DIAGNOSIS — E11.42 DIABETIC POLYNEUROPATHY ASSOCIATED WITH TYPE 2 DIABETES MELLITUS: ICD-10-CM

## 2022-04-07 DIAGNOSIS — I10 ESSENTIAL HYPERTENSION: ICD-10-CM

## 2022-04-07 DIAGNOSIS — E11.65 TYPE 2 DIABETES MELLITUS WITH HYPERGLYCEMIA, WITH LONG-TERM CURRENT USE OF INSULIN: ICD-10-CM

## 2022-04-07 LAB
25(OH)D3 SERPL-MCNC: 18.6 NG/ML (ref 30–100)
ALBUMIN SERPL-MCNC: 4.5 G/DL (ref 3.5–5.2)
ALBUMIN UR-MCNC: 5.9 MG/DL
ALBUMIN/GLOB SERPL: 1.4 G/DL
ALP SERPL-CCNC: 60 U/L (ref 39–117)
ALT SERPL W P-5'-P-CCNC: 35 U/L (ref 1–33)
ANION GAP SERPL CALCULATED.3IONS-SCNC: 12 MMOL/L (ref 5–15)
AST SERPL-CCNC: 40 U/L (ref 1–32)
BASOPHILS # BLD AUTO: 0.06 10*3/MM3 (ref 0–0.2)
BASOPHILS NFR BLD AUTO: 0.7 % (ref 0–1.5)
BILIRUB SERPL-MCNC: 0.6 MG/DL (ref 0–1.2)
BUN SERPL-MCNC: 12 MG/DL (ref 8–23)
BUN/CREAT SERPL: 16.2 (ref 7–25)
CALCIUM SPEC-SCNC: 9.3 MG/DL (ref 8.6–10.5)
CHLORIDE SERPL-SCNC: 102 MMOL/L (ref 98–107)
CHOLEST SERPL-MCNC: 108 MG/DL (ref 0–200)
CO2 SERPL-SCNC: 30 MMOL/L (ref 22–29)
CREAT SERPL-MCNC: 0.74 MG/DL (ref 0.57–1)
CREAT UR-MCNC: 125 MG/DL
CREAT UR-MCNC: 125 MG/DL
DEPRECATED RDW RBC AUTO: 45.8 FL (ref 37–54)
EGFRCR SERPLBLD CKD-EPI 2021: 87.7 ML/MIN/1.73
EOSINOPHIL # BLD AUTO: 0.2 10*3/MM3 (ref 0–0.4)
EOSINOPHIL NFR BLD AUTO: 2.4 % (ref 0.3–6.2)
ERYTHROCYTE [DISTWIDTH] IN BLOOD BY AUTOMATED COUNT: 14.6 % (ref 12.3–15.4)
GLOBULIN UR ELPH-MCNC: 3.3 GM/DL
GLUCOSE SERPL-MCNC: 148 MG/DL (ref 65–99)
HBA1C MFR BLD: 7.5 % (ref 4.8–5.6)
HCT VFR BLD AUTO: 39.5 % (ref 34–46.6)
HDLC SERPL-MCNC: 39 MG/DL (ref 40–60)
HGB BLD-MCNC: 13.5 G/DL (ref 12–15.9)
IMM GRANULOCYTES # BLD AUTO: 0.05 10*3/MM3 (ref 0–0.05)
IMM GRANULOCYTES NFR BLD AUTO: 0.6 % (ref 0–0.5)
LDLC SERPL CALC-MCNC: 44 MG/DL (ref 0–100)
LDLC/HDLC SERPL: 1.02 {RATIO}
LYMPHOCYTES # BLD AUTO: 2.73 10*3/MM3 (ref 0.7–3.1)
LYMPHOCYTES NFR BLD AUTO: 32.1 % (ref 19.6–45.3)
MCH RBC QN AUTO: 29.8 PG (ref 26.6–33)
MCHC RBC AUTO-ENTMCNC: 34.2 G/DL (ref 31.5–35.7)
MCV RBC AUTO: 87.2 FL (ref 79–97)
MICROALBUMIN/CREAT UR: 47.2 MG/G
MONOCYTES # BLD AUTO: 0.83 10*3/MM3 (ref 0.1–0.9)
MONOCYTES NFR BLD AUTO: 9.8 % (ref 5–12)
NEUTROPHILS NFR BLD AUTO: 4.64 10*3/MM3 (ref 1.7–7)
NEUTROPHILS NFR BLD AUTO: 54.4 % (ref 42.7–76)
NRBC BLD AUTO-RTO: 0 /100 WBC (ref 0–0.2)
PLATELET # BLD AUTO: 136 10*3/MM3 (ref 140–450)
PMV BLD AUTO: 12.7 FL (ref 6–12)
POTASSIUM SERPL-SCNC: 3.9 MMOL/L (ref 3.5–5.2)
PROT ?TM UR-MCNC: 21.8 MG/DL
PROT SERPL-MCNC: 7.8 G/DL (ref 6–8.5)
PROT/CREAT UR: 174.4 MG/G CREA (ref 0–200)
RBC # BLD AUTO: 4.53 10*6/MM3 (ref 3.77–5.28)
SODIUM SERPL-SCNC: 144 MMOL/L (ref 136–145)
TRIGL SERPL-MCNC: 147 MG/DL (ref 0–150)
TSH SERPL DL<=0.05 MIU/L-ACNC: 3.01 UIU/ML (ref 0.27–4.2)
VIT B12 BLD-MCNC: 542 PG/ML (ref 211–946)
VLDLC SERPL-MCNC: 25 MG/DL (ref 5–40)
WBC NRBC COR # BLD: 8.51 10*3/MM3 (ref 3.4–10.8)

## 2022-04-07 PROCEDURE — 82306 VITAMIN D 25 HYDROXY: CPT

## 2022-04-07 PROCEDURE — 82043 UR ALBUMIN QUANTITATIVE: CPT

## 2022-04-07 PROCEDURE — 80061 LIPID PANEL: CPT

## 2022-04-07 PROCEDURE — 82607 VITAMIN B-12: CPT

## 2022-04-07 PROCEDURE — 83036 HEMOGLOBIN GLYCOSYLATED A1C: CPT

## 2022-04-07 PROCEDURE — 80053 COMPREHEN METABOLIC PANEL: CPT

## 2022-04-07 PROCEDURE — 84443 ASSAY THYROID STIM HORMONE: CPT

## 2022-04-07 PROCEDURE — 85025 COMPLETE CBC W/AUTO DIFF WBC: CPT

## 2022-04-07 PROCEDURE — 84156 ASSAY OF PROTEIN URINE: CPT

## 2022-04-07 PROCEDURE — 82570 ASSAY OF URINE CREATININE: CPT

## 2022-04-14 ENCOUNTER — OFFICE VISIT (OUTPATIENT)
Dept: ENDOCRINOLOGY | Facility: CLINIC | Age: 72
End: 2022-04-14

## 2022-04-14 VITALS
HEIGHT: 66 IN | OXYGEN SATURATION: 96 % | BODY MASS INDEX: 32.8 KG/M2 | WEIGHT: 204.1 LBS | HEART RATE: 94 BPM | SYSTOLIC BLOOD PRESSURE: 156 MMHG | DIASTOLIC BLOOD PRESSURE: 86 MMHG

## 2022-04-14 DIAGNOSIS — E78.2 MIXED HYPERLIPIDEMIA: ICD-10-CM

## 2022-04-14 DIAGNOSIS — Z79.4 TYPE 2 DIABETES MELLITUS WITH HYPERGLYCEMIA, WITH LONG-TERM CURRENT USE OF INSULIN: Primary | ICD-10-CM

## 2022-04-14 DIAGNOSIS — E55.9 VITAMIN D DEFICIENCY: ICD-10-CM

## 2022-04-14 DIAGNOSIS — E11.42 DIABETIC POLYNEUROPATHY ASSOCIATED WITH TYPE 2 DIABETES MELLITUS: ICD-10-CM

## 2022-04-14 DIAGNOSIS — E11.65 TYPE 2 DIABETES MELLITUS WITH HYPERGLYCEMIA, WITH LONG-TERM CURRENT USE OF INSULIN: Primary | ICD-10-CM

## 2022-04-14 DIAGNOSIS — I10 ESSENTIAL HYPERTENSION: ICD-10-CM

## 2022-04-14 PROCEDURE — 99214 OFFICE O/P EST MOD 30 MIN: CPT | Performed by: NURSE PRACTITIONER

## 2022-04-14 RX ORDER — HYDROCHLOROTHIAZIDE 12.5 MG/1
12.5 CAPSULE, GELATIN COATED ORAL DAILY
COMMUNITY

## 2022-04-14 RX ORDER — ATENOLOL 25 MG/1
25 TABLET ORAL DAILY
COMMUNITY

## 2022-04-14 NOTE — PROGRESS NOTES
"Chief Complaint  Diabetes    Subjective          Lizbeth Hurd presents to [facility]  History of Present Illness     In office visit       Primary provider JACOB Osborne     71 year old female presents for follow up      Reason diabetes mellitus type 2     Diagnosed in 2008      Timing constant     Quality improved      Severity moderate             Macrovascular --none         Microvascular -- neuropathy     History of left renal cancer          Context --  Presented for increased thirst, urination and labs revealed diabetes         Diabetes regimen      Oral medications, insulin               Blood Glucose Readings     Fingerstick's      Checking 3 times daily                   Objective   Vital Signs:   /86   Pulse 94   Ht 167.6 cm (66\")   Wt 92.6 kg (204 lb 1.6 oz)   SpO2 96%   BMI 32.94 kg/m²     Physical Exam  Constitutional:       Appearance: Normal appearance.   Cardiovascular:      Rate and Rhythm: Regular rhythm.      Heart sounds: Normal heart sounds.   Pulmonary:      Breath sounds: Normal breath sounds.   Musculoskeletal:         General: Normal range of motion.      Cervical back: Normal range of motion.   Neurological:      Mental Status: She is alert.        Result Review :   The following data was reviewed by: JACOB Shaw on 04/14/2022:  Common labs    Common Labsle 10/4/21 10/4/21 10/4/21 10/4/21 10/4/21 1/5/22 1/5/22 1/5/22 1/5/22 1/5/22 4/7/22 4/7/22 4/7/22 4/7/22 4/7/22    1141 1141 1141 1141 1141 0945 0945 0945 0945 0945 1031 1031 1031 1031 1031   Glucose   318 (A)    127 (A)     148 (A)      BUN   15    13     12      Creatinine   0.72    0.68     0.74      eGFR Non African Am   80    86           eGFR  Am   97    104           Sodium   136    141     144      Potassium   4.4    4.5     3.9      Chloride   102    103     102      Calcium   9.4    9.7     9.3      Albumin   4.50    4.70     4.50      Total " Bilirubin   1.2    0.8     0.6      Alkaline Phosphatase   63    57     60      AST (SGOT)   35 (A)    38 (A)     40 (A)      ALT (SGPT)   33    39 (A)     35 (A)      WBC 8.96         5.41 8.51       Hemoglobin 13.8         13.3 13.5       Hematocrit 41.3         37.3 39.5       Platelets 158         135 (A) 136 (A)       Total Cholesterol  110      100     108     Triglycerides  136      101     147     HDL Cholesterol  33 (A)      32 (A)     39 (A)     LDL Cholesterol   53      49     44     Hemoglobin A1C    8.33 (A)  7.66 (A)        7.50 (A)    Microalbumin, Urine     4.3    6.5      5.9   (A) Abnormal value                      Assessment and Plan    Diagnoses and all orders for this visit:    1. Type 2 diabetes mellitus with hyperglycemia, with long-term current use of insulin (HCC) (Primary)  -     CBC & Differential; Future  -     Comprehensive Metabolic Panel; Future  -     Hemoglobin A1c; Future  -     Lipid Panel; Future  -     Microalbumin / Creatinine Urine Ratio - Urine, Clean Catch; Future  -     TSH; Future  -     Vitamin B12; Future  -     Vitamin D 25 Hydroxy; Future    2. Diabetic polyneuropathy associated with type 2 diabetes mellitus (HCC)  -     CBC & Differential; Future  -     Comprehensive Metabolic Panel; Future  -     Hemoglobin A1c; Future  -     Lipid Panel; Future  -     Microalbumin / Creatinine Urine Ratio - Urine, Clean Catch; Future  -     TSH; Future  -     Vitamin B12; Future  -     Vitamin D 25 Hydroxy; Future    3. Mixed hyperlipidemia  -     CBC & Differential; Future  -     Comprehensive Metabolic Panel; Future  -     Hemoglobin A1c; Future  -     Lipid Panel; Future  -     Microalbumin / Creatinine Urine Ratio - Urine, Clean Catch; Future  -     TSH; Future  -     Vitamin B12; Future  -     Vitamin D 25 Hydroxy; Future    4. Essential hypertension  -     CBC & Differential; Future  -     Comprehensive Metabolic Panel; Future  -     Hemoglobin A1c; Future  -     Lipid Panel;  Future  -     Microalbumin / Creatinine Urine Ratio - Urine, Clean Catch; Future  -     TSH; Future  -     Vitamin B12; Future  -     Vitamin D 25 Hydroxy; Future    5. Vitamin D deficiency  -     CBC & Differential; Future  -     Comprehensive Metabolic Panel; Future  -     Hemoglobin A1c; Future  -     Lipid Panel; Future  -     Microalbumin / Creatinine Urine Ratio - Urine, Clean Catch; Future  -     TSH; Future  -     Vitamin B12; Future  -     Vitamin D 25 Hydroxy; Future    Other orders  -     Insulin Glargine, 1 Unit Dial, (TOUJEO) 300 UNIT/ML solution pen-injector injection; Inject 70 Units under the skin into the appropriate area as directed Every Night.  Dispense: 7 pen; Refill: 11            Glycemic Management     Diabetes mellitus type 2      Lab Results   Component Value Date    HGBA1C 7.50 (H) 04/07/2022                 Taking Toujeo  50 units once night -- keep         If am sugar is less than 80 decrease by 5 units             Humalog as carb counting         3 units for every 15 grams of CHO for each meal TID --increase to 4 units per 15 grams of CHO        Plus sliding scale         2 per 50 above 150         Aim for 45 grams of carbohydrate per meal      Aim for 15 grams of carbohydrate per snack         Previous regimen            Metformin 850 mg one po BID ---stopped due side effects         amaryl 2 mg BID --- -stopped when starting insulin      Humalog sliding scale -stop                      Lipid Management              Taking niacin         Total Cholesterol   Date Value Ref Range Status   04/07/2022 108 0 - 200 mg/dL Final     Triglycerides   Date Value Ref Range Status   04/07/2022 147 0 - 150 mg/dL Final     HDL Cholesterol   Date Value Ref Range Status   04/07/2022 39 (L) 40 - 60 mg/dL Final     LDL Cholesterol    Date Value Ref Range Status   04/07/2022 44 0 - 100 mg/dL Final           Blood Pressure Management           Taking Lisinopril 20 mg daily     Taking norvasc 5 mg daily ---  stopped due to swelling                     Microvascular Complication Monitoring       Last eye exam Sept 2019 , no DR , postponed due to pandemic      Neuropathy      Taking gapapentin           Component      Latest Ref Rng & Units 4/7/2022 4/7/2022          10:31 AM 10:31 AM   Microalbumin/Creatinine Ratio      mg/g 47.2    Creatinine, Urine      mg/dL 125.0 125.0   Microalbumin, Urine      mg/dL 5.9    Protein/Creatinine Ratio      0.0 - 200.0 mg/G Crea  174.4   Total Protein, Urine      mg/dL  21.8                 Start cymbalta 30 mg one daily -did not start            Bone Health        Vitamin d def.     Taking vitamin d supplement --misses doses        Component      Latest Ref Rng & Units 4/7/2022   25 Hydroxy, Vitamin D      30.0 - 100.0 ng/ml 18.6 (L)           Other Diabetes Related Aspects              Thyroid Health        Lab Results   Component Value Date    TSH 3.010 04/07/2022          Lab Results   Component Value Date    [id number] 542 04/07/2022           Weight management     Overweight     Patient's Body mass index is 32.94 kg/m². indicating that she is obese (BMI >30). Obesity-related health conditions include the following: diabetes mellitus. Obesity is unchanged. BMI is is above average; no BMI management plan is appropriate. We discussed portion control and increasing exercise..        elevated liver enzymes follows with GI and hematology                Follow Up   Return in about 3 months (around 7/14/2022) for Recheck.  Patient was given instructions and counseling regarding her condition or for health maintenance advice. Please see specific information pulled into the AVS if appropriate.         This document has been electronically signed by JACOB Shaw on April 14, 2022 15:56 CDT.

## 2022-05-09 RX ORDER — DULOXETIN HYDROCHLORIDE 30 MG/1
CAPSULE, DELAYED RELEASE ORAL
Qty: 30 CAPSULE | Refills: 0 | Status: SHIPPED | OUTPATIENT
Start: 2022-05-09 | End: 2022-06-13

## 2022-06-13 RX ORDER — DULOXETIN HYDROCHLORIDE 30 MG/1
CAPSULE, DELAYED RELEASE ORAL
Qty: 30 CAPSULE | Refills: 0 | Status: SHIPPED | OUTPATIENT
Start: 2022-06-13 | End: 2022-07-21 | Stop reason: SDUPTHER

## 2022-06-20 RX ORDER — GLUCOSAM/CHON-MSM1/C/MANG/BOSW 500-416.6
TABLET ORAL
Qty: 100 EACH | Refills: 0 | Status: SHIPPED | OUTPATIENT
Start: 2022-06-20 | End: 2022-11-01 | Stop reason: SDUPTHER

## 2022-06-20 RX ORDER — BLOOD-GLUCOSE METER
KIT MISCELLANEOUS
Qty: 100 EACH | Refills: 3 | Status: SHIPPED | OUTPATIENT
Start: 2022-06-20 | End: 2022-11-01 | Stop reason: SDUPTHER

## 2022-06-20 RX ORDER — FLURBIPROFEN SODIUM 0.3 MG/ML
SOLUTION/ DROPS OPHTHALMIC
Qty: 200 EACH | Refills: 0 | Status: SHIPPED | OUTPATIENT
Start: 2022-06-20 | End: 2022-08-08

## 2022-06-20 NOTE — TELEPHONE ENCOUNTER
Pt needs script for mini pen needles 31G x 5MM,Cymbalta, EZ Max test strips and lancets sent Replaced by [facility] in [place]. PT IS OUT OF NEEDLES         Thanks

## 2022-07-14 ENCOUNTER — LAB (OUTPATIENT)
Dept: LAB | Facility: HOSPITAL | Age: 72
End: 2022-07-14

## 2022-07-14 DIAGNOSIS — E78.2 MIXED HYPERLIPIDEMIA: ICD-10-CM

## 2022-07-14 DIAGNOSIS — E11.42 DIABETIC POLYNEUROPATHY ASSOCIATED WITH TYPE 2 DIABETES MELLITUS: ICD-10-CM

## 2022-07-14 DIAGNOSIS — I10 ESSENTIAL HYPERTENSION: ICD-10-CM

## 2022-07-14 DIAGNOSIS — E11.65 TYPE 2 DIABETES MELLITUS WITH HYPERGLYCEMIA, WITH LONG-TERM CURRENT USE OF INSULIN: ICD-10-CM

## 2022-07-14 DIAGNOSIS — Z79.4 TYPE 2 DIABETES MELLITUS WITH HYPERGLYCEMIA, WITH LONG-TERM CURRENT USE OF INSULIN: ICD-10-CM

## 2022-07-14 DIAGNOSIS — E55.9 VITAMIN D DEFICIENCY: ICD-10-CM

## 2022-07-14 LAB
25(OH)D3 SERPL-MCNC: 24.1 NG/ML (ref 30–100)
ALBUMIN SERPL-MCNC: 4.4 G/DL (ref 3.5–5.2)
ALBUMIN UR-MCNC: 9.1 MG/DL
ALBUMIN/GLOB SERPL: 1.3 G/DL
ALP SERPL-CCNC: 47 U/L (ref 39–117)
ALT SERPL W P-5'-P-CCNC: 25 U/L (ref 1–33)
ANION GAP SERPL CALCULATED.3IONS-SCNC: 11 MMOL/L (ref 5–15)
AST SERPL-CCNC: 32 U/L (ref 1–32)
BASOPHILS # BLD AUTO: 0.07 10*3/MM3 (ref 0–0.2)
BASOPHILS NFR BLD AUTO: 0.8 % (ref 0–1.5)
BILIRUB SERPL-MCNC: 0.8 MG/DL (ref 0–1.2)
BUN SERPL-MCNC: 17 MG/DL (ref 8–23)
BUN/CREAT SERPL: 21.8 (ref 7–25)
CALCIUM SPEC-SCNC: 9.8 MG/DL (ref 8.6–10.5)
CHLORIDE SERPL-SCNC: 105 MMOL/L (ref 98–107)
CHOLEST SERPL-MCNC: 99 MG/DL (ref 0–200)
CO2 SERPL-SCNC: 29 MMOL/L (ref 22–29)
CREAT SERPL-MCNC: 0.78 MG/DL (ref 0.57–1)
CREAT UR-MCNC: 84.2 MG/DL
DEPRECATED RDW RBC AUTO: 45.9 FL (ref 37–54)
EGFRCR SERPLBLD CKD-EPI 2021: 81.8 ML/MIN/1.73
EOSINOPHIL # BLD AUTO: 0.18 10*3/MM3 (ref 0–0.4)
EOSINOPHIL NFR BLD AUTO: 2 % (ref 0.3–6.2)
ERYTHROCYTE [DISTWIDTH] IN BLOOD BY AUTOMATED COUNT: 14.4 % (ref 12.3–15.4)
GLOBULIN UR ELPH-MCNC: 3.3 GM/DL
GLUCOSE SERPL-MCNC: 111 MG/DL (ref 65–99)
HBA1C MFR BLD: 7.5 % (ref 4.8–5.6)
HCT VFR BLD AUTO: 36.8 % (ref 34–46.6)
HDLC SERPL-MCNC: 38 MG/DL (ref 40–60)
HGB BLD-MCNC: 13.4 G/DL (ref 12–15.9)
IMM GRANULOCYTES # BLD AUTO: 0.04 10*3/MM3 (ref 0–0.05)
IMM GRANULOCYTES NFR BLD AUTO: 0.4 % (ref 0–0.5)
LDLC SERPL CALC-MCNC: 35 MG/DL (ref 0–100)
LDLC/HDLC SERPL: 0.82 {RATIO}
LYMPHOCYTES # BLD AUTO: 2.5 10*3/MM3 (ref 0.7–3.1)
LYMPHOCYTES NFR BLD AUTO: 27.1 % (ref 19.6–45.3)
MCH RBC QN AUTO: 32 PG (ref 26.6–33)
MCHC RBC AUTO-ENTMCNC: 36.4 G/DL (ref 31.5–35.7)
MCV RBC AUTO: 87.8 FL (ref 79–97)
MICROALBUMIN/CREAT UR: 108.1 MG/G
MONOCYTES # BLD AUTO: 0.89 10*3/MM3 (ref 0.1–0.9)
MONOCYTES NFR BLD AUTO: 9.7 % (ref 5–12)
NEUTROPHILS NFR BLD AUTO: 5.53 10*3/MM3 (ref 1.7–7)
NEUTROPHILS NFR BLD AUTO: 60 % (ref 42.7–76)
NRBC BLD AUTO-RTO: 0 /100 WBC (ref 0–0.2)
PLATELET # BLD AUTO: 172 10*3/MM3 (ref 140–450)
PMV BLD AUTO: 12.4 FL (ref 6–12)
POTASSIUM SERPL-SCNC: 4.1 MMOL/L (ref 3.5–5.2)
PROT SERPL-MCNC: 7.7 G/DL (ref 6–8.5)
RBC # BLD AUTO: 4.19 10*6/MM3 (ref 3.77–5.28)
SODIUM SERPL-SCNC: 145 MMOL/L (ref 136–145)
TRIGL SERPL-MCNC: 150 MG/DL (ref 0–150)
TSH SERPL DL<=0.05 MIU/L-ACNC: 1.29 UIU/ML (ref 0.27–4.2)
VIT B12 BLD-MCNC: 583 PG/ML (ref 211–946)
VLDLC SERPL-MCNC: 26 MG/DL (ref 5–40)
WBC NRBC COR # BLD: 9.21 10*3/MM3 (ref 3.4–10.8)

## 2022-07-14 PROCEDURE — 82043 UR ALBUMIN QUANTITATIVE: CPT

## 2022-07-14 PROCEDURE — 80061 LIPID PANEL: CPT

## 2022-07-14 PROCEDURE — 82570 ASSAY OF URINE CREATININE: CPT

## 2022-07-14 PROCEDURE — 84443 ASSAY THYROID STIM HORMONE: CPT

## 2022-07-14 PROCEDURE — 82607 VITAMIN B-12: CPT

## 2022-07-14 PROCEDURE — 80053 COMPREHEN METABOLIC PANEL: CPT

## 2022-07-14 PROCEDURE — 85025 COMPLETE CBC W/AUTO DIFF WBC: CPT

## 2022-07-14 PROCEDURE — 83036 HEMOGLOBIN GLYCOSYLATED A1C: CPT

## 2022-07-14 PROCEDURE — 82306 VITAMIN D 25 HYDROXY: CPT

## 2022-07-21 ENCOUNTER — OFFICE VISIT (OUTPATIENT)
Dept: ENDOCRINOLOGY | Facility: CLINIC | Age: 72
End: 2022-07-21

## 2022-07-21 VITALS
WEIGHT: 203 LBS | HEART RATE: 82 BPM | BODY MASS INDEX: 32.62 KG/M2 | DIASTOLIC BLOOD PRESSURE: 82 MMHG | HEIGHT: 66 IN | OXYGEN SATURATION: 94 % | SYSTOLIC BLOOD PRESSURE: 134 MMHG

## 2022-07-21 DIAGNOSIS — Z79.4 TYPE 2 DIABETES MELLITUS WITH HYPERGLYCEMIA, WITH LONG-TERM CURRENT USE OF INSULIN: Primary | ICD-10-CM

## 2022-07-21 DIAGNOSIS — E55.9 VITAMIN D DEFICIENCY: ICD-10-CM

## 2022-07-21 DIAGNOSIS — I10 ESSENTIAL HYPERTENSION: ICD-10-CM

## 2022-07-21 DIAGNOSIS — E78.2 MIXED HYPERLIPIDEMIA: ICD-10-CM

## 2022-07-21 DIAGNOSIS — E11.42 DIABETIC POLYNEUROPATHY ASSOCIATED WITH TYPE 2 DIABETES MELLITUS: ICD-10-CM

## 2022-07-21 DIAGNOSIS — E11.65 TYPE 2 DIABETES MELLITUS WITH HYPERGLYCEMIA, WITH LONG-TERM CURRENT USE OF INSULIN: Primary | ICD-10-CM

## 2022-07-21 PROCEDURE — 99214 OFFICE O/P EST MOD 30 MIN: CPT | Performed by: NURSE PRACTITIONER

## 2022-07-21 RX ORDER — DULOXETIN HYDROCHLORIDE 30 MG/1
30 CAPSULE, DELAYED RELEASE ORAL DAILY
Qty: 90 CAPSULE | Refills: 3 | Status: SHIPPED | OUTPATIENT
Start: 2022-07-21 | End: 2022-11-01

## 2022-07-21 RX ORDER — CHLORAL HYDRATE 500 MG
1000 CAPSULE ORAL 2 TIMES DAILY WITH MEALS
COMMUNITY
Start: 2022-07-12

## 2022-07-21 RX ORDER — ERGOCALCIFEROL 1.25 MG/1
50000 CAPSULE ORAL
COMMUNITY
Start: 2022-07-12

## 2022-07-21 NOTE — PROGRESS NOTES
"Chief Complaint  Diabetes    Subjective          Lizbeth Hurd presents to [facility] ENDOCRINOLOG[facility]  History of Present Illness       In office visit       Primary provider JACOB Osborne     71 year old female presents for follow up      Reason diabetes mellitus type 2     Diagnosed in 2008      Timing constant     Quality improved      Severity moderate            Microvascular -- neuropathy, no DR      History of left renal cancer                    Diabetes regimen      Oral medications, insulin               Blood Glucose Readings     Fingerstick's      Checking 3 times daily      Am readings 169 to 225    Daytime under 180     Review of Systems - General ROS: negative               Objective   Vital Signs:   /82   Pulse 82   Ht 167.6 cm (66\")   Wt 92.1 kg (203 lb)   SpO2 94%   BMI 32.77 kg/m²     Physical Exam  Constitutional:       Appearance: Normal appearance.   Cardiovascular:      Rate and Rhythm: Regular rhythm.      Heart sounds: Normal heart sounds.   Pulmonary:      Breath sounds: Normal breath sounds.   Musculoskeletal:         General: Normal range of motion.      Cervical back: Normal range of motion.   Neurological:      Mental Status: She is alert.        Result Review :   The following data was reviewed by: JACOB Shaw on 04/14/2022:  Common labs    Common Labsle 1/5/22 1/5/22 1/5/22 1/5/22 1/5/22 4/7/22 4/7/22 4/7/22 4/7/22 4/7/22 7/14/22 7/14/22 7/14/22 7/14/22 7/14/22    0945 0945 0945 0945 0945 1031 1031 1031 1031 1031 0907 0907 0907 0907 0907   Glucose  127 (A)     148 (A)     111 (A)      BUN  13     12     17      Creatinine  0.68     0.74     0.78      eGFR Non African Am  86                eGFR  Am  104                Sodium  141     144     145      Potassium  4.5     3.9     4.1      Chloride  103     102     105      Calcium  9.7     9.3     9.8      Albumin  4.70     4.50     4.40      Total Bilirubin  0.8     0.6    "  0.8      Alkaline Phosphatase  57     60     47      AST (SGOT)  38 (A)     40 (A)     32      ALT (SGPT)  39 (A)     35 (A)     25      WBC     5.41 8.51     9.21       Hemoglobin     13.3 13.5     13.4       Hematocrit     37.3 39.5     36.8       Platelets     135 (A) 136 (A)     172       Total Cholesterol   100     108     99     Triglycerides   101     147     150     HDL Cholesterol   32 (A)     39 (A)     38 (A)     LDL Cholesterol    49     44     35     Hemoglobin A1C 7.66 (A)        7.50 (A)      7.50 (A)   Microalbumin, Urine    6.5      5.9    9.1    (A) Abnormal value                        Assessment and Plan    Diagnoses and all orders for this visit:    1. Type 2 diabetes mellitus with hyperglycemia, with long-term current use of insulin (HCC) (Primary)  -     CBC & Differential; Future  -     Comprehensive Metabolic Panel; Future  -     Hemoglobin A1c; Future  -     Lipid Panel; Future  -     Microalbumin / Creatinine Urine Ratio - Urine, Clean Catch; Future  -     TSH; Future  -     Vitamin D 25 Hydroxy; Future    2. Mixed hyperlipidemia  -     CBC & Differential; Future  -     Comprehensive Metabolic Panel; Future  -     Hemoglobin A1c; Future  -     Lipid Panel; Future  -     Microalbumin / Creatinine Urine Ratio - Urine, Clean Catch; Future  -     TSH; Future  -     Vitamin D 25 Hydroxy; Future    3. Essential hypertension  -     CBC & Differential; Future  -     Comprehensive Metabolic Panel; Future  -     Hemoglobin A1c; Future  -     Lipid Panel; Future  -     Microalbumin / Creatinine Urine Ratio - Urine, Clean Catch; Future  -     TSH; Future  -     Vitamin D 25 Hydroxy; Future    4. Diabetic polyneuropathy associated with type 2 diabetes mellitus (HCC)  -     CBC & Differential; Future  -     Comprehensive Metabolic Panel; Future  -     Hemoglobin A1c; Future  -     Lipid Panel; Future  -     Microalbumin / Creatinine Urine Ratio - Urine, Clean Catch; Future  -     TSH; Future  -      Vitamin D 25 Hydroxy; Future    5. Vitamin D deficiency  -     CBC & Differential; Future  -     Comprehensive Metabolic Panel; Future  -     Hemoglobin A1c; Future  -     Lipid Panel; Future  -     Microalbumin / Creatinine Urine Ratio - Urine, Clean Catch; Future  -     TSH; Future  -     Vitamin D 25 Hydroxy; Future    Other orders  -     DULoxetine (CYMBALTA) 30 MG capsule; Take 1 capsule by mouth Daily.  Dispense: 90 capsule; Refill: 3            Glycemic Management     Diabetes mellitus type 2      Lab Results   Component Value Date    HGBA1C 7.50 (H) 07/14/2022                 Taking Toujeo  50 units once night --increase to 55 units         If am sugar is less than 80 decrease by 5 units             Humalog as carb counting         Taking 15 up to 25 units TID      Plus sliding scale         2 per 50 above 150         Aim for 45 grams of carbohydrate per meal      Aim for 15 grams of carbohydrate per snack         Previous regimen            Metformin 850 mg one po BID ---stopped due side effects         amaryl 2 mg BID --- -stopped when starting insulin      Humalog sliding scale -stop                      Lipid Management              Taking niacin         Total Cholesterol   Date Value Ref Range Status   07/14/2022 99 0 - 200 mg/dL Final     Triglycerides   Date Value Ref Range Status   07/14/2022 150 0 - 150 mg/dL Final     HDL Cholesterol   Date Value Ref Range Status   07/14/2022 38 (L) 40 - 60 mg/dL Final     LDL Cholesterol    Date Value Ref Range Status   07/14/2022 35 0 - 100 mg/dL Final           Blood Pressure Management           Taking Lisinopril 20 mg daily     Taking norvasc 5 mg daily --- stopped due to swelling                     Microvascular Complication Monitoring         Eye exam June 2022, no Dr     She has cataracts --surgery next week      Neuropathy      Taking gapapentin          Component      Latest Ref Rng & Units 7/14/2022   Microalbumin/Creatinine Ratio      mg/g 108.1    Creatinine, Urine      mg/dL 84.2   Microalbumin, Urine      mg/dL 9.1                 Cymbalta 30 mg one daily --taking            Bone Health        Vitamin d def.     Taking vitamin d supplement -    Taking 50,000 units daily      Component      Latest Ref Rng & Units 7/14/2022   25 Hydroxy, Vitamin D      30.0 - 100.0 ng/ml 24.1 (L)          Other Diabetes Related Aspects              Thyroid Health        Lab Results   Component Value Date    TSH 1.290 07/14/2022          Lab Results   Component Value Date    [id number] 583 07/14/2022           Weight management     Overweight     Patient's Body mass index is 32.77 kg/m². indicating that she is obese (BMI >30). Obesity-related health conditions include the following: diabetes mellitus. Obesity is unchanged. BMI is is above average; no BMI management plan is appropriate. We discussed portion control and increasing exercise..        elevated liver enzymes follows with GI and hematology                Follow Up   Return in about 3 months (around 10/21/2022) for Recheck.  Patient was given instructions and counseling regarding her condition or for health maintenance advice. Please see specific information pulled into the AVS if appropriate.         This document has been electronically signed by JACOB Shaw on July 21, 2022 14:29 CDT.

## 2022-08-08 RX ORDER — FLURBIPROFEN SODIUM 0.3 MG/ML
SOLUTION/ DROPS OPHTHALMIC
Qty: 200 EACH | Refills: 0 | Status: SHIPPED | OUTPATIENT
Start: 2022-08-08 | End: 2022-09-28

## 2022-09-28 RX ORDER — FLURBIPROFEN SODIUM 0.3 MG/ML
SOLUTION/ DROPS OPHTHALMIC
Qty: 200 EACH | Refills: 0 | Status: SHIPPED | OUTPATIENT
Start: 2022-09-28 | End: 2022-11-01 | Stop reason: SDUPTHER

## 2022-10-25 ENCOUNTER — LAB (OUTPATIENT)
Dept: LAB | Facility: HOSPITAL | Age: 72
End: 2022-10-25

## 2022-10-25 DIAGNOSIS — E78.2 MIXED HYPERLIPIDEMIA: ICD-10-CM

## 2022-10-25 DIAGNOSIS — E11.42 DIABETIC POLYNEUROPATHY ASSOCIATED WITH TYPE 2 DIABETES MELLITUS: ICD-10-CM

## 2022-10-25 DIAGNOSIS — I10 ESSENTIAL HYPERTENSION: ICD-10-CM

## 2022-10-25 DIAGNOSIS — E11.65 TYPE 2 DIABETES MELLITUS WITH HYPERGLYCEMIA, WITH LONG-TERM CURRENT USE OF INSULIN: ICD-10-CM

## 2022-10-25 DIAGNOSIS — Z79.4 TYPE 2 DIABETES MELLITUS WITH HYPERGLYCEMIA, WITH LONG-TERM CURRENT USE OF INSULIN: ICD-10-CM

## 2022-10-25 DIAGNOSIS — E55.9 VITAMIN D DEFICIENCY: ICD-10-CM

## 2022-10-25 LAB
25(OH)D3 SERPL-MCNC: 26.4 NG/ML (ref 30–100)
ALBUMIN SERPL-MCNC: 4.4 G/DL (ref 3.5–5.2)
ALBUMIN UR-MCNC: 5.6 MG/DL
ALBUMIN/GLOB SERPL: 1.3 G/DL
ALP SERPL-CCNC: 46 U/L (ref 39–117)
ALT SERPL W P-5'-P-CCNC: 30 U/L (ref 1–33)
ANION GAP SERPL CALCULATED.3IONS-SCNC: 12 MMOL/L (ref 5–15)
AST SERPL-CCNC: 31 U/L (ref 1–32)
BASOPHILS # BLD AUTO: 0.07 10*3/MM3 (ref 0–0.2)
BASOPHILS NFR BLD AUTO: 0.8 % (ref 0–1.5)
BILIRUB SERPL-MCNC: 0.8 MG/DL (ref 0–1.2)
BUN SERPL-MCNC: 19 MG/DL (ref 8–23)
BUN/CREAT SERPL: 24.7 (ref 7–25)
CALCIUM SPEC-SCNC: 9.8 MG/DL (ref 8.6–10.5)
CHLORIDE SERPL-SCNC: 102 MMOL/L (ref 98–107)
CHOLEST SERPL-MCNC: 127 MG/DL (ref 0–200)
CO2 SERPL-SCNC: 29 MMOL/L (ref 22–29)
CREAT SERPL-MCNC: 0.77 MG/DL (ref 0.57–1)
CREAT UR-MCNC: 146.8 MG/DL
DEPRECATED RDW RBC AUTO: 46.1 FL (ref 37–54)
EGFRCR SERPLBLD CKD-EPI 2021: 83.1 ML/MIN/1.73
EOSINOPHIL # BLD AUTO: 0.19 10*3/MM3 (ref 0–0.4)
EOSINOPHIL NFR BLD AUTO: 2.2 % (ref 0.3–6.2)
ERYTHROCYTE [DISTWIDTH] IN BLOOD BY AUTOMATED COUNT: 14.4 % (ref 12.3–15.4)
GLOBULIN UR ELPH-MCNC: 3.4 GM/DL
GLUCOSE SERPL-MCNC: 145 MG/DL (ref 65–99)
HBA1C MFR BLD: 7 % (ref 4.8–5.6)
HCT VFR BLD AUTO: 38.5 % (ref 34–46.6)
HDLC SERPL-MCNC: 35 MG/DL (ref 40–60)
HGB BLD-MCNC: 12.9 G/DL (ref 12–15.9)
IMM GRANULOCYTES # BLD AUTO: 0.03 10*3/MM3 (ref 0–0.05)
IMM GRANULOCYTES NFR BLD AUTO: 0.4 % (ref 0–0.5)
LDLC SERPL CALC-MCNC: 60 MG/DL (ref 0–100)
LDLC/HDLC SERPL: 1.55 {RATIO}
LYMPHOCYTES # BLD AUTO: 2.38 10*3/MM3 (ref 0.7–3.1)
LYMPHOCYTES NFR BLD AUTO: 27.9 % (ref 19.6–45.3)
MCH RBC QN AUTO: 29.8 PG (ref 26.6–33)
MCHC RBC AUTO-ENTMCNC: 33.5 G/DL (ref 31.5–35.7)
MCV RBC AUTO: 88.9 FL (ref 79–97)
MICROALBUMIN/CREAT UR: 38.1 MG/G
MONOCYTES # BLD AUTO: 0.73 10*3/MM3 (ref 0.1–0.9)
MONOCYTES NFR BLD AUTO: 8.6 % (ref 5–12)
NEUTROPHILS NFR BLD AUTO: 5.12 10*3/MM3 (ref 1.7–7)
NEUTROPHILS NFR BLD AUTO: 60.1 % (ref 42.7–76)
NRBC BLD AUTO-RTO: 0 /100 WBC (ref 0–0.2)
PLATELET # BLD AUTO: 125 10*3/MM3 (ref 140–450)
PMV BLD AUTO: 12.1 FL (ref 6–12)
POTASSIUM SERPL-SCNC: 3.7 MMOL/L (ref 3.5–5.2)
PROT SERPL-MCNC: 7.8 G/DL (ref 6–8.5)
RBC # BLD AUTO: 4.33 10*6/MM3 (ref 3.77–5.28)
SODIUM SERPL-SCNC: 143 MMOL/L (ref 136–145)
TRIGL SERPL-MCNC: 189 MG/DL (ref 0–150)
TSH SERPL DL<=0.05 MIU/L-ACNC: 1.83 UIU/ML (ref 0.27–4.2)
VLDLC SERPL-MCNC: 32 MG/DL (ref 5–40)
WBC NRBC COR # BLD: 8.52 10*3/MM3 (ref 3.4–10.8)

## 2022-10-25 PROCEDURE — 80053 COMPREHEN METABOLIC PANEL: CPT

## 2022-10-25 PROCEDURE — 85025 COMPLETE CBC W/AUTO DIFF WBC: CPT

## 2022-10-25 PROCEDURE — 82043 UR ALBUMIN QUANTITATIVE: CPT

## 2022-10-25 PROCEDURE — 82570 ASSAY OF URINE CREATININE: CPT

## 2022-10-25 PROCEDURE — 36415 COLL VENOUS BLD VENIPUNCTURE: CPT

## 2022-10-25 PROCEDURE — 82306 VITAMIN D 25 HYDROXY: CPT

## 2022-10-25 PROCEDURE — 84443 ASSAY THYROID STIM HORMONE: CPT

## 2022-10-25 PROCEDURE — 80061 LIPID PANEL: CPT

## 2022-10-25 PROCEDURE — 83036 HEMOGLOBIN GLYCOSYLATED A1C: CPT

## 2022-11-01 ENCOUNTER — OFFICE VISIT (OUTPATIENT)
Dept: ENDOCRINOLOGY | Facility: CLINIC | Age: 72
End: 2022-11-01

## 2022-11-01 VITALS
WEIGHT: 206.1 LBS | HEART RATE: 85 BPM | SYSTOLIC BLOOD PRESSURE: 138 MMHG | BODY MASS INDEX: 33.12 KG/M2 | HEIGHT: 66 IN | OXYGEN SATURATION: 90 % | DIASTOLIC BLOOD PRESSURE: 78 MMHG

## 2022-11-01 DIAGNOSIS — E11.42 DIABETIC POLYNEUROPATHY ASSOCIATED WITH TYPE 2 DIABETES MELLITUS: ICD-10-CM

## 2022-11-01 DIAGNOSIS — Z79.4 TYPE 2 DIABETES MELLITUS WITH HYPERGLYCEMIA, WITH LONG-TERM CURRENT USE OF INSULIN: Primary | ICD-10-CM

## 2022-11-01 DIAGNOSIS — E78.2 MIXED HYPERLIPIDEMIA: ICD-10-CM

## 2022-11-01 DIAGNOSIS — I10 ESSENTIAL HYPERTENSION: ICD-10-CM

## 2022-11-01 DIAGNOSIS — E11.65 TYPE 2 DIABETES MELLITUS WITH HYPERGLYCEMIA, WITH LONG-TERM CURRENT USE OF INSULIN: Primary | ICD-10-CM

## 2022-11-01 DIAGNOSIS — E55.9 VITAMIN D DEFICIENCY: ICD-10-CM

## 2022-11-01 PROCEDURE — 99214 OFFICE O/P EST MOD 30 MIN: CPT | Performed by: NURSE PRACTITIONER

## 2022-11-01 RX ORDER — GLUCOSAM/CHON-MSM1/C/MANG/BOSW 500-416.6
TABLET ORAL
Qty: 120 EACH | Refills: 11 | Status: SHIPPED | OUTPATIENT
Start: 2022-11-01

## 2022-11-01 RX ORDER — INSULIN LISPRO 100 [IU]/ML
INJECTION, SOLUTION INTRAVENOUS; SUBCUTANEOUS
Qty: 100 ML | Refills: 11 | Status: SHIPPED | OUTPATIENT
Start: 2022-11-01

## 2022-11-01 RX ORDER — DULOXETIN HYDROCHLORIDE 60 MG/1
60 CAPSULE, DELAYED RELEASE ORAL DAILY
Qty: 30 CAPSULE | Refills: 11 | Status: SHIPPED | OUTPATIENT
Start: 2022-11-01 | End: 2023-11-01

## 2022-11-01 RX ORDER — FLURBIPROFEN SODIUM 0.3 MG/ML
SOLUTION/ DROPS OPHTHALMIC
Qty: 200 EACH | Refills: 11 | Status: SHIPPED | OUTPATIENT
Start: 2022-11-01

## 2022-11-01 NOTE — PROGRESS NOTES
"Chief Complaint  Diabetes    Subjective          Lizbeth Hurd presents to [facility] ENDOCRINOLOGY  Diabetes         In office visit       Primary provider JACOB Shaw     72 year old female presents for follow up      Reason diabetes mellitus type 2     Diagnosed in 2008      Timing constant     Quality improved      Severity moderate       Microvascular -- neuropathy, no DR      History of left renal cancer                    Diabetes regimen      Oral medications, insulin               Blood Glucose Readings     Fingerstick's      Checking 3 times daily        At goal    This am was 120         Review of Systems - General ROS: negative               Objective   Vital Signs:   /78   Pulse 85   Ht 167.6 cm (66\")   Wt 93.5 kg (206 lb 1.6 oz)   SpO2 90%   BMI 33.27 kg/m²     Physical Exam  Constitutional:       Appearance: Normal appearance.   Cardiovascular:      Rate and Rhythm: Regular rhythm.      Heart sounds: Normal heart sounds.   Pulmonary:      Breath sounds: Normal breath sounds.   Musculoskeletal:         General: Normal range of motion.      Cervical back: Normal range of motion.   Neurological:      Mental Status: She is alert.        Result Review :   The following data was reviewed by: JACOB Shaw on 04/14/2022:  Common labs    Common Labs 4/7/22 4/7/22 4/7/22 4/7/22 4/7/22 7/14/22 7/14/22 7/14/22 7/14/22 7/14/22 10/25/22 10/25/22 10/25/22 10/25/22 10/25/22    1031 1031 1031 1031 1031 0907 0907 0907 0907 0907 0859 0859 0859 0859 0942   Glucose  148 (A)     111 (A)     145 (A)      BUN  12     17     19      Creatinine  0.74     0.78     0.77      Sodium  144     145     143      Potassium  3.9     4.1     3.7      Chloride  102     105     102      Calcium  9.3     9.8     9.8      Albumin  4.50     4.40     4.40      Total Bilirubin  0.6     0.8     0.8      Alkaline Phosphatase  60     47     46      AST (SGOT)  40 (A)     32     31 "      ALT (SGPT)  35 (A)     25     30      WBC 8.51     9.21     8.52       Hemoglobin 13.5     13.4     12.9       Hematocrit 39.5     36.8     38.5       Platelets 136 (A)     172     125 (A)       Total Cholesterol   108     99     127     Triglycerides   147     150     189 (A)     HDL Cholesterol   39 (A)     38 (A)     35 (A)     LDL Cholesterol    44     35     60     Hemoglobin A1C    7.50 (A)      7.50 (A)    7.00 (A)    Microalbumin, Urine     5.9    9.1      5.6   (A) Abnormal value                        Assessment and Plan    Diagnoses and all orders for this visit:    1. Type 2 diabetes mellitus with hyperglycemia, with long-term current use of insulin (HCC) (Primary)  -     CBC & Differential; Future  -     Comprehensive Metabolic Panel; Future  -     Hemoglobin A1c; Future  -     Lipid Panel; Future  -     Microalbumin / Creatinine Urine Ratio - Urine, Clean Catch; Future  -     TSH; Future  -     Vitamin B12; Future  -     Vitamin D,25-Hydroxy; Future    2. Mixed hyperlipidemia  -     CBC & Differential; Future  -     Comprehensive Metabolic Panel; Future  -     Hemoglobin A1c; Future  -     Lipid Panel; Future  -     Microalbumin / Creatinine Urine Ratio - Urine, Clean Catch; Future  -     TSH; Future  -     Vitamin B12; Future  -     Vitamin D,25-Hydroxy; Future    3. Essential hypertension  -     CBC & Differential; Future  -     Comprehensive Metabolic Panel; Future  -     Hemoglobin A1c; Future  -     Lipid Panel; Future  -     Microalbumin / Creatinine Urine Ratio - Urine, Clean Catch; Future  -     TSH; Future  -     Vitamin B12; Future  -     Vitamin D,25-Hydroxy; Future    4. Diabetic polyneuropathy associated with type 2 diabetes mellitus (HCC)  -     CBC & Differential; Future  -     Comprehensive Metabolic Panel; Future  -     Hemoglobin A1c; Future  -     Lipid Panel; Future  -     Microalbumin / Creatinine Urine Ratio - Urine, Clean Catch; Future  -     TSH; Future  -     Vitamin B12;  Future  -     Vitamin D,25-Hydroxy; Future    5. Vitamin D deficiency  -     CBC & Differential; Future  -     Comprehensive Metabolic Panel; Future  -     Hemoglobin A1c; Future  -     Lipid Panel; Future  -     Microalbumin / Creatinine Urine Ratio - Urine, Clean Catch; Future  -     TSH; Future  -     Vitamin B12; Future  -     Vitamin D,25-Hydroxy; Future    Other orders  -     Insulin Lispro, 1 Unit Dial, (HumaLOG KwikPen) 100 UNIT/ML solution pen-injector; 20  Up to 40  units with meals  Dispense: 100 mL; Refill: 11  -     Insulin Glargine, 1 Unit Dial, (TOUJEO) 300 UNIT/ML solution pen-injector injection; Inject 70 Units under the skin into the appropriate area as directed Every Night.  Dispense: 200 mL; Refill: 11  -     glucose blood (EasyMax Test) test strip; USE AS DIRECTED 4 TIMES DAILY  Dispense: 100 each; Refill: 3  -     B-D UF III MINI PEN NEEDLES 31G X 5 MM misc; Inject 4 to 5 tmes daily , E11.65  Dispense: 200 each; Refill: 11  -     DULoxetine (Cymbalta) 60 MG capsule; Take 1 capsule by mouth Daily.  Dispense: 30 capsule; Refill: 11  -     TRUEplus Lancets 33G misc; USE AS DIRECTED 4 TIMES DAILY  Dispense: 120 each; Refill: 11            Glycemic Management     Diabetes mellitus type 2      Lab Results   Component Value Date    HGBA1C 7.00 (H) 10/25/2022                 Taking Toujeo   55 units         If am sugar is less than 80 decrease by 5 units             Humalog as carb counting         Taking 15 up to 25 units TID      Plus sliding scale         2 per 50 above 150         Aim for 45 grams of carbohydrate per meal      Aim for 15 grams of carbohydrate per snack         Previous regimen            Metformin 850 mg one po BID ---stopped due side effects         amaryl 2 mg BID --- -stopped when starting insulin      Humalog sliding scale -stop                      Lipid Management              Taking niacin         Total Cholesterol   Date Value Ref Range Status   10/25/2022 127 0 - 200 mg/dL  Final     Triglycerides   Date Value Ref Range Status   10/25/2022 189 (H) 0 - 150 mg/dL Final     HDL Cholesterol   Date Value Ref Range Status   10/25/2022 35 (L) 40 - 60 mg/dL Final     LDL Cholesterol    Date Value Ref Range Status   10/25/2022 60 0 - 100 mg/dL Final           Blood Pressure Management           Taking Lisinopril 20 mg daily     Taking norvasc 5 mg daily --- stopped due to swelling                     Microvascular Complication Monitoring         Eye exam June 2022, no Dr     She had bilateral cataract surgery      Neuropathy      Taking gapapentin        Component      Latest Ref Rng & Units 10/25/2022   Microalbumin/Creatinine Ratio      mg/g 38.1   Creatinine, Urine      mg/dL 146.8   Microalbumin, Urine      mg/dL 5.6                 Cymbalta 30 mg one daily --increase to 60 mg            Bone Health        Vitamin d def.     Taking vitamin d supplement -    Taking 50,000 units daily      Component      Latest Ref Rng & Units 7/14/2022   25 Hydroxy, Vitamin D      30.0 - 100.0 ng/ml 24.1 (L)          Other Diabetes Related Aspects              Thyroid Health        Lab Results   Component Value Date    TSH 1.830 10/25/2022          Lab Results   Component Value Date    [id number] 583 07/14/2022           Weight management     Overweight     Patient's Body mass index is 33.27 kg/m². indicating that she is obese (BMI >30). Obesity-related health conditions include the following: diabetes mellitus. Obesity is unchanged. BMI is is above average; no BMI management plan is appropriate. We discussed portion control and increasing exercise..        elevated liver enzymes follows with GI and hematology                Follow Up   Return in about 3 months (around 2/1/2023) for Recheck.  Patient was given instructions and counseling regarding her condition or for health maintenance advice. Please see specific information pulled into the AVS if appropriate.         This document has been electronically  signed by JACOB Shaw on November 1, 2022 15:23 CDT.

## 2023-01-17 DIAGNOSIS — R07.9 CHEST PAIN, UNSPECIFIED TYPE: Primary | ICD-10-CM

## 2023-01-17 DIAGNOSIS — R01.1 UNDIAGNOSED CARDIAC MURMURS: ICD-10-CM

## 2023-01-17 DIAGNOSIS — R06.00 DYSPNEA, UNSPECIFIED TYPE: ICD-10-CM

## 2023-02-06 ENCOUNTER — LAB (OUTPATIENT)
Dept: LAB | Facility: HOSPITAL | Age: 73
End: 2023-02-06
Payer: MEDICARE

## 2023-02-06 DIAGNOSIS — E11.65 TYPE 2 DIABETES MELLITUS WITH HYPERGLYCEMIA, WITH LONG-TERM CURRENT USE OF INSULIN: ICD-10-CM

## 2023-02-06 DIAGNOSIS — Z79.4 TYPE 2 DIABETES MELLITUS WITH HYPERGLYCEMIA, WITH LONG-TERM CURRENT USE OF INSULIN: ICD-10-CM

## 2023-02-06 DIAGNOSIS — I10 ESSENTIAL HYPERTENSION: ICD-10-CM

## 2023-02-06 DIAGNOSIS — E55.9 VITAMIN D DEFICIENCY: ICD-10-CM

## 2023-02-06 DIAGNOSIS — E78.2 MIXED HYPERLIPIDEMIA: ICD-10-CM

## 2023-02-06 DIAGNOSIS — E11.42 DIABETIC POLYNEUROPATHY ASSOCIATED WITH TYPE 2 DIABETES MELLITUS: ICD-10-CM

## 2023-02-06 LAB
25(OH)D3 SERPL-MCNC: 36.1 NG/ML (ref 30–100)
ALBUMIN SERPL-MCNC: 4.2 G/DL (ref 3.5–5.2)
ALBUMIN UR-MCNC: 3.8 MG/DL
ALBUMIN/GLOB SERPL: 1.2 G/DL
ALP SERPL-CCNC: 44 U/L (ref 39–117)
ALT SERPL W P-5'-P-CCNC: 27 U/L (ref 1–33)
ANION GAP SERPL CALCULATED.3IONS-SCNC: 11 MMOL/L (ref 5–15)
AST SERPL-CCNC: 30 U/L (ref 1–32)
BASOPHILS # BLD AUTO: 0.07 10*3/MM3 (ref 0–0.2)
BASOPHILS NFR BLD AUTO: 0.9 % (ref 0–1.5)
BILIRUB SERPL-MCNC: 0.9 MG/DL (ref 0–1.2)
BUN SERPL-MCNC: 18 MG/DL (ref 8–23)
BUN/CREAT SERPL: 23.7 (ref 7–25)
CALCIUM SPEC-SCNC: 9.5 MG/DL (ref 8.6–10.5)
CHLORIDE SERPL-SCNC: 102 MMOL/L (ref 98–107)
CHOLEST SERPL-MCNC: 117 MG/DL (ref 0–200)
CO2 SERPL-SCNC: 27 MMOL/L (ref 22–29)
CREAT SERPL-MCNC: 0.76 MG/DL (ref 0.57–1)
CREAT UR-MCNC: 168.4 MG/DL
DEPRECATED RDW RBC AUTO: 45.9 FL (ref 37–54)
EGFRCR SERPLBLD CKD-EPI 2021: 84.4 ML/MIN/1.73
EOSINOPHIL # BLD AUTO: 0.15 10*3/MM3 (ref 0–0.4)
EOSINOPHIL NFR BLD AUTO: 1.9 % (ref 0.3–6.2)
ERYTHROCYTE [DISTWIDTH] IN BLOOD BY AUTOMATED COUNT: 14.2 % (ref 12.3–15.4)
GLOBULIN UR ELPH-MCNC: 3.4 GM/DL
GLUCOSE SERPL-MCNC: 116 MG/DL (ref 65–99)
HBA1C MFR BLD: 7.1 % (ref 4.8–5.6)
HCT VFR BLD AUTO: 38.9 % (ref 34–46.6)
HDLC SERPL-MCNC: 31 MG/DL (ref 40–60)
HGB BLD-MCNC: 12.9 G/DL (ref 12–15.9)
IMM GRANULOCYTES # BLD AUTO: 0.04 10*3/MM3 (ref 0–0.05)
IMM GRANULOCYTES NFR BLD AUTO: 0.5 % (ref 0–0.5)
LDLC SERPL CALC-MCNC: 58 MG/DL (ref 0–100)
LDLC/HDLC SERPL: 1.73 {RATIO}
LYMPHOCYTES # BLD AUTO: 2.37 10*3/MM3 (ref 0.7–3.1)
LYMPHOCYTES NFR BLD AUTO: 30.6 % (ref 19.6–45.3)
MCH RBC QN AUTO: 29.7 PG (ref 26.6–33)
MCHC RBC AUTO-ENTMCNC: 33.2 G/DL (ref 31.5–35.7)
MCV RBC AUTO: 89.4 FL (ref 79–97)
MICROALBUMIN/CREAT UR: 22.6 MG/G
MONOCYTES # BLD AUTO: 0.72 10*3/MM3 (ref 0.1–0.9)
MONOCYTES NFR BLD AUTO: 9.3 % (ref 5–12)
NEUTROPHILS NFR BLD AUTO: 4.39 10*3/MM3 (ref 1.7–7)
NEUTROPHILS NFR BLD AUTO: 56.8 % (ref 42.7–76)
NRBC BLD AUTO-RTO: 0 /100 WBC (ref 0–0.2)
PLATELET # BLD AUTO: 137 10*3/MM3 (ref 140–450)
PMV BLD AUTO: 12.4 FL (ref 6–12)
POTASSIUM SERPL-SCNC: 3.6 MMOL/L (ref 3.5–5.2)
PROT SERPL-MCNC: 7.6 G/DL (ref 6–8.5)
RBC # BLD AUTO: 4.35 10*6/MM3 (ref 3.77–5.28)
SODIUM SERPL-SCNC: 140 MMOL/L (ref 136–145)
TRIGL SERPL-MCNC: 162 MG/DL (ref 0–150)
TSH SERPL DL<=0.05 MIU/L-ACNC: 2.06 UIU/ML (ref 0.27–4.2)
VIT B12 BLD-MCNC: 716 PG/ML (ref 211–946)
VLDLC SERPL-MCNC: 28 MG/DL (ref 5–40)
WBC NRBC COR # BLD: 7.74 10*3/MM3 (ref 3.4–10.8)

## 2023-02-06 PROCEDURE — 82607 VITAMIN B-12: CPT

## 2023-02-06 PROCEDURE — 82043 UR ALBUMIN QUANTITATIVE: CPT

## 2023-02-06 PROCEDURE — 83036 HEMOGLOBIN GLYCOSYLATED A1C: CPT

## 2023-02-06 PROCEDURE — 80053 COMPREHEN METABOLIC PANEL: CPT

## 2023-02-06 PROCEDURE — 85025 COMPLETE CBC W/AUTO DIFF WBC: CPT

## 2023-02-06 PROCEDURE — 80061 LIPID PANEL: CPT

## 2023-02-06 PROCEDURE — 82306 VITAMIN D 25 HYDROXY: CPT

## 2023-02-06 PROCEDURE — 82570 ASSAY OF URINE CREATININE: CPT

## 2023-02-06 PROCEDURE — 84443 ASSAY THYROID STIM HORMONE: CPT

## 2023-02-07 ENCOUNTER — HOSPITAL ENCOUNTER (OUTPATIENT)
Dept: NUCLEAR MEDICINE | Facility: HOSPITAL | Age: 73
Discharge: HOME | End: 2023-02-07
Payer: MEDICARE

## 2023-02-07 ENCOUNTER — HOSPITAL ENCOUNTER (OUTPATIENT)
Dept: CARDIOLOGY | Facility: HOSPITAL | Age: 73
Discharge: HOME | End: 2023-02-07
Payer: MEDICARE

## 2023-02-07 DIAGNOSIS — R06.00 DYSPNEA, UNSPECIFIED TYPE: ICD-10-CM

## 2023-02-07 DIAGNOSIS — R01.1 UNDIAGNOSED CARDIAC MURMURS: ICD-10-CM

## 2023-02-07 DIAGNOSIS — R07.9 CHEST PAIN, UNSPECIFIED TYPE: ICD-10-CM

## 2023-02-07 LAB
BH CV REST NUCLEAR ISOTOPE DOSE: 11 MCI
BH CV STRESS BP STAGE 1: NORMAL
BH CV STRESS COMMENTS STAGE 1: NORMAL
BH CV STRESS DOSE REGADENOSON STAGE 1: 0.4
BH CV STRESS DURATION MIN STAGE 1: 0
BH CV STRESS DURATION SEC STAGE 1: 10
BH CV STRESS HR STAGE 1: 88
BH CV STRESS NUCLEAR ISOTOPE DOSE: 35.9 MCI
BH CV STRESS PROTOCOL 1: NORMAL
BH CV STRESS RECOVERY BP: NORMAL MMHG
BH CV STRESS RECOVERY HR: 109 BPM
BH CV STRESS STAGE 1: 1
LV EF NUC BP: 67 %
MAXIMAL PREDICTED HEART RATE: 150 BPM
PERCENT MAX PREDICTED HR: 74.67 %
STRESS BASELINE BP: NORMAL MMHG
STRESS BASELINE HR: 94 BPM
STRESS PERCENT HR: 88 %
STRESS POST ESTIMATED WORKLOAD: 1 METS
STRESS POST PEAK BP: NORMAL MMHG
STRESS POST PEAK HR: 112 BPM
STRESS TARGET HR: 128 BPM

## 2023-02-07 PROCEDURE — A9500 TC99M SESTAMIBI: HCPCS | Performed by: INTERNAL MEDICINE

## 2023-02-07 PROCEDURE — 0 TECHNETIUM SESTAMIBI: Performed by: INTERNAL MEDICINE

## 2023-02-07 PROCEDURE — 78452 HT MUSCLE IMAGE SPECT MULT: CPT

## 2023-02-07 PROCEDURE — 78452 HT MUSCLE IMAGE SPECT MULT: CPT | Performed by: INTERNAL MEDICINE

## 2023-02-07 PROCEDURE — 25010000002 REGADENOSON 0.4 MG/5ML SOLUTION: Performed by: INTERNAL MEDICINE

## 2023-02-07 PROCEDURE — 93017 CV STRESS TEST TRACING ONLY: CPT

## 2023-02-07 RX ORDER — SODIUM CHLORIDE 0.9 % (FLUSH) 0.9 %
10 SYRINGE (ML) INJECTION ONCE
Status: COMPLETED | OUTPATIENT
Start: 2023-02-07 | End: 2023-02-07

## 2023-02-07 RX ADMIN — TECHNETIUM TC 99M SESTAMIBI 1 DOSE: 1 INJECTION INTRAVENOUS at 08:59

## 2023-02-07 RX ADMIN — Medication 10 ML: at 08:58

## 2023-02-07 RX ADMIN — REGADENOSON 0.4 MG: 0.08 INJECTION, SOLUTION INTRAVENOUS at 08:58

## 2023-02-07 RX ADMIN — TECHNETIUM TC 99M SESTAMIBI 1 DOSE: 1 INJECTION INTRAVENOUS at 07:32

## 2023-02-07 NOTE — H&P
Cardiology History and Physical Note        Patient Name: Lizbeth Hurd  Age/Sex: 72 y.o. female  : 1950  MRN: [id number]    Date of Admission : 2023    Primary care Physician: Jess Cotter APRN    Reason for Admission: Chest pain exercise Cardiolite stress test      Subjective:       Chief Complaint: Chest pain    History of Present Illness:  Lizbeth Hurd is a 72 y.o. female     Height of 5 feet 6 inches weight of 205 pounds with a body mass index of 30 with a past medical history significant for baseline  resting electrocardiogram with right bundle branch block, arterial hypertension, hypertensive heart disease, hyperlipidemia, insulin-dependent diabetes, thrombocytopenia with a platelet count of 137, vitamin D deficiency, diabetic neuropathy, obesity with a body mass index of 30, and chronic back pain s/p back surgery.  Patient has been followed for her diabetes by the endocrinology JACOB Shaw   Patient presents for further evaluation for symptoms of chest pain.    Patient complains of having substernal chest discomfort on and off associated with symptoms of shortness of breath.  Patient is complaining of having symptoms of lightheaded dizziness.  Patient complains of having symptoms of shortness of breath with minimal activity.  Patient has bilateral lower extremity edema.  Patient denies excessive intake of salt.  Patient complains of having symptoms of tired and fatigue.  Patient does complain of having episodes of snoring with no previous sleep study.    Patient on further questioning denies any symptoms of hemoptysis hematuria or bright blood per rectum.  Patient denies previous cardiac evaluation.    Patient resting electrocardiogram done revealed sinus rhythm at the rate of 83 bpm with a right bundle branch block.    Patient blood pressure is 130/75 pulse of 84 regular respiration of 18 oxygen saturation of 97%.    Patient 10 point review of system except for what is  stated in the history of present illness is negative.    Concurrent Medical History:  1.  Chest pain shortness of breath leg edema.  2.  Right bundle branch block.  3.  Arterial hypertension.  4.  Hypertensive heart disease.  5.  Hyperlipidemia.  6.  Insulin requiring diabetes  7.  Diabetic neuropathy.  8.  Vitamin D deficiency.  9.  Chronic back pain s/p back surgery.  10.  Obesity with a body mass index of 30.  11.  Episodes of snoring  12.  Thrombocytopenia with a platelet count of 137      Past Surgical History:  1.  Tonsillectomy;   2.  Appendectomy;   3.  Hysterectomy;   4.  Cholecystectomy;   5.  Back surgery  6.  Colonoscopy.  7.  Esophagogastroduodenoscopy.  8.  Right shoulder surgery.  9.  Cataract surgery.  10.  Tubal ligation      Family History: No history of premature atherosclerotic coronary artery disease      Social History:   Reports that she has never smoked. She has never used smokeless tobacco.       Cardiac Risk factor:  1.  Postmenopausal.  2.  Arterial hypertension.  3.  Hyperlipidemia.  4.  Diabetes.  5.  Obesity.      Allergies:  No Known Allergies    Home Medication::  Prior to Admission medications    Medication Sig Start Date End Date Taking? Authorizing Provider   atenolol (TENORMIN) 25 MG tablet Take 25 mg by mouth Daily.    Neo Hancock MD   atorvastatin (LIPITOR) 10 MG tablet Take 10 mg by mouth Daily.    ProviderNeo MD   B-D UF III MINI PEN NEEDLES 31G X 5 MM misc Inject 4 to 5 tmes daily , E11.65 11/1/22   Dick Callahan APRN   cyclobenzaprine (FLEXERIL) 5 MG tablet  7/2/20   Neo Hancock MD   DULoxetine (Cymbalta) 60 MG capsule Take 1 capsule by mouth Daily. 11/1/22 11/1/23  Dick Callahan APRN   famotidine (PEPCID) 20 MG tablet  7/2/20   Neo Hancock MD   gabapentin (NEURONTIN) 600 MG tablet Take 3,600 mg by mouth Every Night.    Neo Hancock MD   glucose blood (EasyMax Test) test strip USE AS DIRECTED 4 TIMES DAILY  11/1/22   Dick Callahan APRN   hydroCHLOROthiazide (MICROZIDE) 12.5 MG capsule Take 12.5 mg by mouth Daily.    Neo Hancock MD   HYDROcodone-acetaminophen (NORCO) 7.5-325 MG per tablet Take 1 tablet by mouth Every 6 (Six) Hours As Needed for Moderate Pain .    Neo Hancock MD   Insulin Glargine, 1 Unit Dial, (TOUJEO) 300 UNIT/ML solution pen-injector injection Inject 70 Units under the skin into the appropriate area as directed Every Night. 11/1/22   Dick Callahan APRN   insulin lispro (humaLOG) 100 UNIT/ML injection Inject 15 up to 30 units 4 times daily before each meal 10/11/21   Dick Callahan APRN   Insulin Lispro, 1 Unit Dial, (HumaLOG KwikPen) 100 UNIT/ML solution pen-injector 20  Up to 40  units with meals 11/1/22   Dick Callahan APRN   lisinopril (PRINIVIL,ZESTRIL) 20 MG tablet Take 20 mg by mouth Daily.    Neo Hancock MD   Omega-3 Fatty Acids (fish oil) 1000 MG capsule capsule  7/12/22   Neo Hancock MD   TRUEplus Lancets 33G misc USE AS DIRECTED 4 TIMES DAILY 11/1/22   Dick Callahan APRN   vitamin D (ERGOCALCIFEROL) 1.25 MG (53673 UT) capsule capsule  7/12/22   Neo Hancock MD         Review of Systems   Constitutional: Positive for fatigue. Negative for chills, fever and unexpected weight change.   HENT: Negative for hearing loss and nosebleeds.    Eyes: Negative for visual disturbance.   Respiratory: Positive for chest tightness and shortness of breath. Negative for cough and wheezing.    Cardiovascular: Positive for chest pain and leg swelling. Negative for palpitations.   Gastrointestinal: Negative for abdominal pain, blood in stool, constipation, diarrhea, nausea and vomiting.   Endocrine: Negative for cold intolerance, heat intolerance, polydipsia, polyphagia and polyuria.   Genitourinary: Negative for hematuria.   Musculoskeletal: Positive for back pain. Negative for joint swelling, myalgias and neck  pain.   Skin: Negative for color change, rash and wound.   Neurological: Negative for dizziness, seizures, syncope, light-headedness, numbness and headaches.   Hematological: Does not bruise/bleed easily.         Objective:       /75 pulse 85 resp 18 oxygen saturation of 95%  Constitutional:       Appearance: Well-developed.   Eyes:      General: No scleral icterus.     Conjunctiva/sclera: Conjunctivae normal.      Pupils: Pupils are equal, round, and reactive to light.   HENT:      Head: Normocephalic and atraumatic.      Left Ear: External ear normal.      Nose: Nose normal.   Neck:      Thyroid: No thyromegaly.      Vascular: No JVD.      Trachea: No tracheal deviation.      Lymphadenopathy: No cervical adenopathy.   Pulmonary:      Breath sounds: Normal breath sounds. No stridor.   Cardiovascular:      Normal rate. Regular rhythm.   Abdominal:      General: Bowel sounds are normal.   Musculoskeletal: Normal range of motion.      Cervical back: Normal range of motion and neck supple. Skin:     General: Skin is warm and dry.   Neurological:      Mental Status: Alert and oriented to person, place, and time.      Deep Tendon Reflexes: Reflexes are normal and symmetric.   Psychiatric:         Behavior: Behavior normal.         Thought Content: Thought content normal.         Judgment: Judgment normal.         Lab Review:     Results from last 7 days   Lab Units 02/06/23  1041   SODIUM mmol/L 140   POTASSIUM mmol/L 3.6   CHLORIDE mmol/L 102   CO2 mmol/L 27.0   BUN mg/dL 18   CREATININE mg/dL 0.76   CALCIUM mg/dL 9.5   BILIRUBIN mg/dL 0.9   ALK PHOS U/L 44   ALT (SGPT) U/L 27   AST (SGOT) U/L 30   GLUCOSE mg/dL 116*             Results from last 7 days   Lab Units 02/06/23  1041   WBC 10*3/mm3 7.74   HEMOGLOBIN g/dL 12.9   HEMATOCRIT % 38.9   PLATELETS 10*3/mm3 137*             Results from last 7 days   Lab Units 02/06/23  1041   CHOLESTEROL mg/dL 117   TRIGLYCERIDES mg/dL 162*   HDL CHOL mg/dL 31*   LDL CHOL  mg/dL 58     Results from last 7 days   Lab Units 02/06/23  1041   TSH uIU/mL 2.060           EKG:   ECG/EMG Results (last 24 hours)     ** No results found for the last 24 hours. **          Imaging:  Imaging Results (Last 24 Hours)     ** No results found for the last 24 hours. **          I personally viewed and interpreted the patient's EKG/Telemetry data.    Assessment:   1.  Chest pain.  2.  Right bundle branch block.  3.  Arterial hypertension.  4.  Hypertensive heart disease.  5.  Hyperlipidemia.  6.  Diabetes.  7.  Vitamin D deficiency.  8.  Obesity.          Plan:   1.  Chest pain.  Patient does have symptoms of substernal chest discomfort associated with shortness of breath.  Patient does complain of having bilateral lower extremity edema.  Patient baseline resting electrocardiogram done reveals sinus rhythm at the rate of 85 bpm with a right bundle branch block.  Patient is a pleasant time has been recommended to undergo a transthoracic echocardiogram to evaluate the left ventricular systolic function and to rule out regional segmental wall motion abnormality and a myocardial perfusion exercise Cardiolite stress test.  Risk-benefit treatment option for the myocardial perfusion Exercise Cardiolite stress test was discussed with the patient and an informed consent was obtained.  Myocardial perfusion scintigraphy (MPS)  was recommended to the patient as the best non-invasive modality for the assessment of obstructive CAD.  I  did spend some time discussing the procedure, as well as risks and benefits.  I also informed the patient that the risk of nonfatal MI or major cardiac complication is about  0.02%. The patient was also informed about the risks and benefits of MPS. Patient was also provided with a handout describing the test, as well as patient instructions prior to testing.      I also discussed the results of MPS as divided into risks.The NPV of this test is as high as 98%.  I also specifically  discussed the risk of cardiac death with the following percentages:    Low-risk MPS:                          0.5% per year  Mildly abnormal MPS:              2.7%  Moderately abnormal:             2.9%  Severely abnormal:                 4.2%    2.  Baseline resting electrocardiogram with a right bundle branch block.  Patient on questioning does complain of having some symptoms of lightheaded dizziness.  Patient baseline resting heart rate is 85 bpm.  Patient would undergo an exercise stress test to rule out chronotropic incompetence.  Patient denies any syncopal episode.    3.  Shortness of breath with bilateral lower extremity edema with a heart murmur.  Patient has been counseled to decrease her salt intake and to undergo a transthoracic echocardiogram to evaluate the left ventricular systolic function and to rule out regional segmental wall motion abnormality.    4.  Arterial hypertension.  Patient blood pressure is currently well controlled.  Patient will be continued on the present dose of the lisinopril and atenolol 25 mg once a day.    5.  Hypertensive heart disease clinically at the present time patient is not in congestive heart failure.  Patient is to undergo a transthoracic echocardiogram to evaluate the left ventricular systolic function and to rule out valvular insufficiency.    6.  Hyperlipidemia.  Patient has been counseled on low-fat low-cholesterol diet and to continue with the present dose of the Lipitor 10 mg at bedtime.  Patient last lipid profile did reveal an elevated LDL.    7.  Vitamin D deficiency.  Patient is on vitamin D supplement and has been followed by the primary care physician.    8.  Insulin requiring diabetes.  Patient has been counseled on [employer] diet.  Patient blood sugar has been followed by endocrinology.      9.  Chronic back pain status post lower back surgery.  Patient is on Norco and gabapentin.    10.  Episodes of snoring with no previous sleep  study.  Patient has been recommended to undergo sleep study to rule out obstructive sleep apnea.    11.  Obesity with a body mass index of 30.  Patient has been counseled on weight reduction lifestyle modification and dietary restriction.  Patient has been explained the risk associated with obesity in the current and progression of atherosclerotic coronary artery disease and peripheral vascular disease.    12.  Diabetic neuropathy.  Patient is on gabapentin and has been followed by the primary care physician.      Above plan of management were discussed with the patient    Time: time spent in face-to-face evaluation of greater than 55 minutes and interacting and formulating examining and discussing the plan with the patient with 50% of greater time spent in face-to-face interaction.    Electronically signed by Tristan Everett MD, 02/07/23, 5:14 AM CST.    Dictated utilizing Dragon dictation.

## 2023-02-09 ENCOUNTER — PREP FOR SURGERY (OUTPATIENT)
Dept: OTHER | Facility: HOSPITAL | Age: 73
End: 2023-02-09
Payer: COMMERCIAL

## 2023-02-09 DIAGNOSIS — R94.39 ABNORMAL NUCLEAR STRESS TEST: Primary | ICD-10-CM

## 2023-02-09 DIAGNOSIS — R93.1 ABNORMAL NUCLEAR CARDIAC IMAGING TEST: ICD-10-CM

## 2023-02-09 RX ORDER — SODIUM CHLORIDE 0.9 % (FLUSH) 0.9 %
3 SYRINGE (ML) INJECTION EVERY 12 HOURS SCHEDULED
Status: CANCELLED | OUTPATIENT
Start: 2023-02-10

## 2023-02-09 RX ORDER — SODIUM CHLORIDE 0.9 % (FLUSH) 0.9 %
10 SYRINGE (ML) INJECTION AS NEEDED
Status: CANCELLED | OUTPATIENT
Start: 2023-02-10

## 2023-02-09 RX ORDER — SODIUM CHLORIDE 9 MG/ML
40 INJECTION, SOLUTION INTRAVENOUS AS NEEDED
Status: CANCELLED | OUTPATIENT
Start: 2023-02-10

## 2023-02-10 ENCOUNTER — HOSPITAL ENCOUNTER (OUTPATIENT)
Facility: HOSPITAL | Age: 73
Setting detail: HOSPITAL OUTPATIENT SURGERY
Discharge: HOME | End: 2023-02-10
Attending: INTERNAL MEDICINE | Admitting: INTERNAL MEDICINE
Payer: MEDICARE

## 2023-02-10 VITALS
OXYGEN SATURATION: 97 % | TEMPERATURE: 97.2 F | WEIGHT: 201.94 LBS | HEIGHT: 66 IN | HEART RATE: 91 BPM | SYSTOLIC BLOOD PRESSURE: 158 MMHG | DIASTOLIC BLOOD PRESSURE: 77 MMHG | RESPIRATION RATE: 20 BRPM | BODY MASS INDEX: 32.45 KG/M2

## 2023-02-10 DIAGNOSIS — R93.1 ABNORMAL NUCLEAR CARDIAC IMAGING TEST: ICD-10-CM

## 2023-02-10 DIAGNOSIS — R94.39 ABNORMAL NUCLEAR STRESS TEST: ICD-10-CM

## 2023-02-10 LAB
ANION GAP SERPL CALCULATED.3IONS-SCNC: 12 MMOL/L (ref 5–15)
BASOPHILS # BLD AUTO: 0.07 10*3/MM3 (ref 0–0.2)
BASOPHILS NFR BLD AUTO: 0.7 % (ref 0–1.5)
BUN SERPL-MCNC: 19 MG/DL (ref 8–23)
BUN/CREAT SERPL: 24.1 (ref 7–25)
CALCIUM SPEC-SCNC: 10.1 MG/DL (ref 8.6–10.5)
CHLORIDE SERPL-SCNC: 101 MMOL/L (ref 98–107)
CO2 SERPL-SCNC: 25 MMOL/L (ref 22–29)
CREAT SERPL-MCNC: 0.79 MG/DL (ref 0.57–1)
DEPRECATED RDW RBC AUTO: 43.8 FL (ref 37–54)
EGFRCR SERPLBLD CKD-EPI 2021: 80.6 ML/MIN/1.73
EOSINOPHIL # BLD AUTO: 0.14 10*3/MM3 (ref 0–0.4)
EOSINOPHIL NFR BLD AUTO: 1.3 % (ref 0.3–6.2)
ERYTHROCYTE [DISTWIDTH] IN BLOOD BY AUTOMATED COUNT: 13.8 % (ref 12.3–15.4)
GLUCOSE SERPL-MCNC: 280 MG/DL (ref 65–99)
HCT VFR BLD AUTO: 39.2 % (ref 34–46.6)
HGB BLD-MCNC: 13.3 G/DL (ref 12–15.9)
IMM GRANULOCYTES # BLD AUTO: 0.03 10*3/MM3 (ref 0–0.05)
IMM GRANULOCYTES NFR BLD AUTO: 0.3 % (ref 0–0.5)
INR PPP: 0.98 (ref 0.8–1.2)
LYMPHOCYTES # BLD AUTO: 2.75 10*3/MM3 (ref 0.7–3.1)
LYMPHOCYTES NFR BLD AUTO: 26.5 % (ref 19.6–45.3)
MCH RBC QN AUTO: 29.7 PG (ref 26.6–33)
MCHC RBC AUTO-ENTMCNC: 33.9 G/DL (ref 31.5–35.7)
MCV RBC AUTO: 87.5 FL (ref 79–97)
MONOCYTES # BLD AUTO: 0.9 10*3/MM3 (ref 0.1–0.9)
MONOCYTES NFR BLD AUTO: 8.7 % (ref 5–12)
NEUTROPHILS NFR BLD AUTO: 6.49 10*3/MM3 (ref 1.7–7)
NEUTROPHILS NFR BLD AUTO: 62.5 % (ref 42.7–76)
NRBC BLD AUTO-RTO: 0 /100 WBC (ref 0–0.2)
PLATELET # BLD AUTO: 125 10*3/MM3 (ref 140–450)
PMV BLD AUTO: 12.2 FL (ref 6–12)
POTASSIUM SERPL-SCNC: 4.2 MMOL/L (ref 3.5–5.2)
PROTHROMBIN TIME: 12.9 SECONDS (ref 11.1–15.3)
RBC # BLD AUTO: 4.48 10*6/MM3 (ref 3.77–5.28)
SODIUM SERPL-SCNC: 138 MMOL/L (ref 136–145)
WBC NRBC COR # BLD: 10.38 10*3/MM3 (ref 3.4–10.8)

## 2023-02-10 PROCEDURE — 93458 L HRT ARTERY/VENTRICLE ANGIO: CPT | Performed by: INTERNAL MEDICINE

## 2023-02-10 PROCEDURE — 25010000002 FENTANYL CITRATE (PF) 50 MCG/ML SOLUTION: Performed by: INTERNAL MEDICINE

## 2023-02-10 PROCEDURE — C1760 CLOSURE DEV, VASC: HCPCS | Performed by: INTERNAL MEDICINE

## 2023-02-10 PROCEDURE — C1894 INTRO/SHEATH, NON-LASER: HCPCS | Performed by: INTERNAL MEDICINE

## 2023-02-10 PROCEDURE — 85025 COMPLETE CBC W/AUTO DIFF WBC: CPT | Performed by: INTERNAL MEDICINE

## 2023-02-10 PROCEDURE — 80048 BASIC METABOLIC PNL TOTAL CA: CPT | Performed by: INTERNAL MEDICINE

## 2023-02-10 PROCEDURE — 99152 MOD SED SAME PHYS/QHP 5/>YRS: CPT | Performed by: INTERNAL MEDICINE

## 2023-02-10 PROCEDURE — 25010000002 HEPARIN (PORCINE) PER 1000 UNITS: Performed by: INTERNAL MEDICINE

## 2023-02-10 PROCEDURE — C1769 GUIDE WIRE: HCPCS | Performed by: INTERNAL MEDICINE

## 2023-02-10 PROCEDURE — 85610 PROTHROMBIN TIME: CPT | Performed by: INTERNAL MEDICINE

## 2023-02-10 PROCEDURE — 25010000002 MIDAZOLAM PER 1 MG: Performed by: INTERNAL MEDICINE

## 2023-02-10 PROCEDURE — 0 IOPAMIDOL PER 1 ML: Performed by: INTERNAL MEDICINE

## 2023-02-10 RX ORDER — SODIUM CHLORIDE 9 MG/ML
40 INJECTION, SOLUTION INTRAVENOUS AS NEEDED
Status: DISCONTINUED | OUTPATIENT
Start: 2023-02-10 | End: 2023-02-10 | Stop reason: HOSPADM

## 2023-02-10 RX ORDER — SODIUM CHLORIDE 9 MG/ML
100 INJECTION, SOLUTION INTRAVENOUS CONTINUOUS
Status: DISCONTINUED | OUTPATIENT
Start: 2023-02-10 | End: 2023-02-10 | Stop reason: HOSPADM

## 2023-02-10 RX ORDER — SODIUM CHLORIDE 0.9 % (FLUSH) 0.9 %
10 SYRINGE (ML) INJECTION AS NEEDED
Status: DISCONTINUED | OUTPATIENT
Start: 2023-02-10 | End: 2023-02-10 | Stop reason: HOSPADM

## 2023-02-10 RX ORDER — LIDOCAINE HYDROCHLORIDE 20 MG/ML
INJECTION, SOLUTION INFILTRATION; PERINEURAL
Status: DISCONTINUED | OUTPATIENT
Start: 2023-02-10 | End: 2023-02-10 | Stop reason: HOSPADM

## 2023-02-10 RX ORDER — ACETAMINOPHEN 325 MG/1
650 TABLET ORAL EVERY 4 HOURS PRN
Status: DISCONTINUED | OUTPATIENT
Start: 2023-02-10 | End: 2023-02-10 | Stop reason: HOSPADM

## 2023-02-10 RX ORDER — SODIUM CHLORIDE 0.9 % (FLUSH) 0.9 %
3 SYRINGE (ML) INJECTION EVERY 12 HOURS SCHEDULED
Status: DISCONTINUED | OUTPATIENT
Start: 2023-02-10 | End: 2023-02-10 | Stop reason: HOSPADM

## 2023-02-10 RX ORDER — LISINOPRIL 30 MG/1
30 TABLET ORAL DAILY
COMMUNITY

## 2023-02-10 RX ORDER — FENTANYL CITRATE 50 UG/ML
INJECTION, SOLUTION INTRAMUSCULAR; INTRAVENOUS
Status: DISCONTINUED | OUTPATIENT
Start: 2023-02-10 | End: 2023-02-10 | Stop reason: HOSPADM

## 2023-02-10 RX ORDER — MIDAZOLAM HYDROCHLORIDE 1 MG/ML
INJECTION INTRAMUSCULAR; INTRAVENOUS
Status: DISCONTINUED | OUTPATIENT
Start: 2023-02-10 | End: 2023-02-10 | Stop reason: HOSPADM

## 2023-02-10 RX ADMIN — SODIUM CHLORIDE 1000 ML: 9 INJECTION, SOLUTION INTRAVENOUS at 07:13

## 2023-02-10 NOTE — H&P
Cardiology History and Physical Note        Patient Name: Lizbeth Hurd  Age/Sex: 72 y.o. female  : 1950  MRN: [id number]    Date of Admission : 2/10/2023    Primary care Physician: Jess Cotter APRN    Reason for Admission: Chest pain positive myocardial perfusion Lexiscan Cardiolite stress test      Subjective:       Chief Complaint: Chest pain and shortness of breath    History of Present Illness:  Lizbeth Hudr is a 72 y.o. female     Body mass index is 32.59 kg/m². with a body mass index of 30 with a past medical history significant for baseline resting electrocardiogram with right bundle branch block, arterial hypertension, hypertensive heart disease, hyperlipidemia, insulin-dependent diabetes, thrombocytopenia with a platelet count of 137, vitamin D deficiency, diabetic neuropathy, obesity with a body mass index of 30, and chronic back pain s/p back surgery.      Patient has been followed for her diabetes by the endocrinology JACOB Shaw Patient presents for further evaluation for symptoms of chest pain.     Patient complains of having substernal chest discomfort on and off associated with symptoms of shortness of breath.    Patient complains of having symptoms of chest pain with activity mopping the floor associated with shortness of breath relieved with rest.    Patient is complaining of having symptoms of lightheaded dizziness.  Patient complains of having symptoms of shortness of breath with minimal activity.  Patient has bilateral lower extremity edema.  Patient denies excessive intake of salt.  Patient complains of having symptoms of tired and fatigue.  Patient does complain of having episodes of snoring with no previous sleep study.     Patient on further questioning denies any symptoms of hemoptysis hematuria or bright blood per rectum.  Patient denies previous cardiac evaluation.     Patient resting electrocardiogram done revealed sinus rhythm at the rate of 83  bpm with a right bundle branch block.     Patient blood pressure is 130/75 pulse of 84 regular respiration of 18 oxygen saturation of 97%.    Patient underwent a myocardial perfusion Lexiscan Cardiolite stress test.  Patient myocardial perfusion Lexiscan Cardiolite stress test was suggestive of reversible ischemia and a high risk study.  Due to the patient positive myocardial perfusion Lexiscan Cardiolite stress test and a high risk study patient was recommended coronary angiogram.     Patient 10 point review of system except for what is stated in the history of present illness is negative.     Concurrent Medical History:  1.  Chest pain shortness of breath leg edema positive myocardial perfusion Lexiscan Cardiolite stress test and a high risk study.  2.  Right bundle branch block.  3.  Arterial hypertension.  4.  Hypertensive heart disease.  5.  Hyperlipidemia.  6.  Insulin requiring diabetes  7.  Diabetic neuropathy.  8.  Vitamin D deficiency.  9.  Chronic back pain s/p back surgery.  10.  Obesity with a body mass index of 30.  11.  Episodes of snoring  12.  Thrombocytopenia with a platelet count of 137        Past Surgical History:  1.  Tonsillectomy;   2.  Appendectomy;   3.  Hysterectomy;   4.  Cholecystectomy;   5.  Back surgery  6.  Colonoscopy.  7.  Esophagogastroduodenoscopy.  8.  Right shoulder surgery.  9.  Cataract surgery.  10.  Tubal ligation        Family History: No history of premature atherosclerotic coronary artery disease        Social History:   Reports that she has never smoked. She has never used smokeless tobacco.        Cardiac Risk factor:  1.  Postmenopausal.  2.  Arterial hypertension.  3.  Hyperlipidemia.  4.  Diabetes.  5.  Obesity.            Allergies:  No Known Allergies    Home Medication::  Prior to Admission medications    Medication Sig Start Date End Date Taking? Authorizing Provider   atenolol (TENORMIN) 25 MG tablet Take 25 mg by mouth Daily.   Yes Provider, MD Neo    HYDROcodone-acetaminophen (NORCO) 7.5-325 MG per tablet Take 1 tablet by mouth Every 6 (Six) Hours As Needed for Moderate Pain .   Yes Neo Hancock MD   Insulin Glargine, 1 Unit Dial, (TOUJEO) 300 UNIT/ML solution pen-injector injection Inject 70 Units under the skin into the appropriate area as directed Every Night. 11/1/22  Yes Dick Callahan APRN   Insulin Lispro, 1 Unit Dial, (HumaLOG KwikPen) 100 UNIT/ML solution pen-injector 20  Up to 40  units with meals 11/1/22  Yes Dick Callahan APRN   lisinopril (PRINIVIL,ZESTRIL) 30 MG tablet Take 30 mg by mouth Daily.   Yes Neo Hancock MD   insulin lispro (humaLOG) 100 UNIT/ML injection Inject 15 up to 30 units 4 times daily before each meal  Patient taking differently: Inject 30 up to 40 units 4 times daily before each meal 10/11/21 2/10/23 Yes Dick Callahan APRN   atorvastatin (LIPITOR) 10 MG tablet Take 10 mg by mouth Daily.    Neo Hancock MD   B-YUE UF III MINI PEN NEEDLES 31G X 5 MM misc Inject 4 to 5 tmes daily , E11.65 11/1/22   Dick Callahan APRN   cyclobenzaprine (FLEXERIL) 5 MG tablet Take 5 mg by mouth 2 (Two) Times a Day As Needed. 7/2/20   Neo Hancock MD   DULoxetine (Cymbalta) 60 MG capsule Take 1 capsule by mouth Daily. 11/1/22 11/1/23  Dick Callahan APRN   gabapentin (NEURONTIN) 600 MG tablet Take 1,800 mg by mouth Every Night.    Neo Hnacock MD   glucose blood (EasyMax Test) test strip USE AS DIRECTED 4 TIMES DAILY 11/1/22   Dick Calalhan APRN   hydroCHLOROthiazide (MICROZIDE) 12.5 MG capsule Take 12.5 mg by mouth Daily.    Neo Hancock MD   Omega-3 Fatty Acids (fish oil) 1000 MG capsule capsule 1,000 mg 2 (Two) Times a Day With Meals. 7/12/22   Neo Hancock MD   TRUEplus Lancets 33G misc USE AS DIRECTED 4 TIMES DAILY 11/1/22   Dick Callahan APRN   vitamin D (ERGOCALCIFEROL) 1.25 MG (25508 UT) capsule capsule Take 50,000  "Units by mouth Every 7 (Seven) Days. 7/12/22   Provider, MD Neo   famotidine (PEPCID) 20 MG tablet  7/2/20 2/10/23  Neo Hancock MD   lisinopril (PRINIVIL,ZESTRIL) 20 MG tablet Take 20 mg by mouth Daily.  2/10/23  ProviderNeo MD         Review of Systems   Constitutional: Positive for fatigue. Negative for chills, fever and unexpected weight change.   HENT: Negative for hearing loss and nosebleeds.    Eyes: Negative for visual disturbance.   Respiratory: Positive for chest tightness and shortness of breath. Negative for cough and wheezing.    Cardiovascular: Positive for chest pain. Negative for palpitations and leg swelling.   Gastrointestinal: Negative for abdominal pain, blood in stool, constipation, diarrhea, nausea and vomiting.   Endocrine: Negative for cold intolerance, heat intolerance, polydipsia, polyphagia and polyuria.   Genitourinary: Negative for hematuria.   Musculoskeletal: Negative for joint swelling, myalgias and neck pain.   Skin: Negative for color change, rash and wound.   Neurological: Negative for dizziness, seizures, syncope, light-headedness, numbness and headaches.   Hematological: Does not bruise/bleed easily.         Objective:     BP (!) 217/95   Pulse 107   Temp 97.7 °F (36.5 °C)   Resp 18   Ht 167.6 cm (66\")   Wt 91.6 kg (201 lb 15.1 oz)   SpO2 95%   BMI 32.59 kg/m²     Constitutional:       Appearance: Well-developed.   Eyes:      General: No scleral icterus.     Conjunctiva/sclera: Conjunctivae normal.      Pupils: Pupils are equal, round, and reactive to light.   HENT:      Head: Normocephalic and atraumatic.      Left Ear: External ear normal.      Nose: Nose normal.   Neck:      Thyroid: No thyromegaly.      Vascular: No JVD.      Trachea: No tracheal deviation.      Lymphadenopathy: No cervical adenopathy.   Pulmonary:      Breath sounds: Normal breath sounds. No stridor.   Cardiovascular:      Normal rate. Regular rhythm.   Abdominal:      " General: Bowel sounds are normal.   Musculoskeletal: Normal range of motion.      Cervical back: Normal range of motion and neck supple. Skin:     General: Skin is warm and dry.   Neurological:      Mental Status: Alert and oriented to person, place, and time.      Deep Tendon Reflexes: Reflexes are normal and symmetric.   Psychiatric:         Behavior: Behavior normal.         Thought Content: Thought content normal.         Judgment: Judgment normal.         Lab Review:     Results from last 7 days   Lab Units 02/10/23  0659 02/06/23  1041   SODIUM mmol/L 138 140   POTASSIUM mmol/L 4.2 3.6   CHLORIDE mmol/L 101 102   CO2 mmol/L 25.0 27.0   BUN mg/dL 19 18   CREATININE mg/dL 0.79 0.76   CALCIUM mg/dL 10.1 9.5   BILIRUBIN mg/dL  --  0.9   ALK PHOS U/L  --  44   ALT (SGPT) U/L  --  27   AST (SGOT) U/L  --  30   GLUCOSE mg/dL 280* 116*             Results from last 7 days   Lab Units 02/10/23  0659   WBC 10*3/mm3 10.38   HEMOGLOBIN g/dL 13.3   HEMATOCRIT % 39.2   PLATELETS 10*3/mm3 125*     Results from last 7 days   Lab Units 02/10/23  0659   INR  0.98         Results from last 7 days   Lab Units 02/06/23  1041   CHOLESTEROL mg/dL 117   TRIGLYCERIDES mg/dL 162*   HDL CHOL mg/dL 31*   LDL CHOL mg/dL 58     Results from last 7 days   Lab Units 02/06/23  1041   TSH uIU/mL 2.060           EKG:   ECG/EMG Results (last 24 hours)     ** No results found for the last 24 hours. **          Imaging:  Imaging Results (Last 24 Hours)     ** No results found for the last 24 hours. **          I personally viewed and interpreted the patient's EKG/Telemetry data.    Assessment:       1.  Chest pain positive myocardial perfusion Lexiscan Cardiolite stress test.  2.  Right bundle branch block.  3.  Arterial hypertension.  4.  Hypertensive heart disease.  5.  Hyperlipidemia.  6.  Diabetes.  7.  Vitamin D deficiency.  8.  Obesity.      Plan:   1.  Chest pain.  Patient does have symptoms of substernal chest discomfort associated with  shortness of breath.  Patient does complain of having bilateral lower extremity edema.  Patient baseline resting electrocardiogram done reveals sinus rhythm at the rate of 85 bpm with a right bundle branch block.     Patient underwent a myocardial perfusion Lexiscan Cardiolite stress test.  Patient myocardial perfusion Lexiscan Cardiolite stress test was suggestive of reversible ischemia.  Patient was recommended coronary angiogram.      Patient has no documented history of atherosclerotic coronary artery disease with symptoms of substernal chest pain suggestive of crescendo unstable angina pectoris.  Patient has been explained risk-benefit treatment option for a coronary angiogram and an informed consent was obtained from the patient for the coronary angiogram.  Patient's Mallampati score was II, patient's ASA classification was II.  Patient will undergo IV hydration and will check the renal function prior to the coronary angiogram.    2.  Baseline resting electrocardiogram with a right bundle branch block.  Patient on questioning does complain of having some symptoms of lightheaded dizziness.  Patient baseline resting heart rate is 85 bpm.  Patient would undergo an exercise stress test to rule out chronotropic incompetence.  Patient denies any syncopal episode.     3.  Shortness of breath with bilateral lower extremity edema with a heart murmur.  Patient has been counseled to decrease her salt intake and to undergo a transthoracic echocardiogram to evaluate the left ventricular systolic function and to rule out regional segmental wall motion abnormality.     4.  Arterial hypertension.  Patient blood pressure is currently well controlled.  Patient will be continued on the present dose of the lisinopril and atenolol 25 mg once a day.     5.  Hypertensive heart disease clinically at the present time patient is not in congestive heart failure.  Patient is to undergo a transthoracic echocardiogram to evaluate the left  ventricular systolic function and to rule out valvular insufficiency.     6.  Hyperlipidemia.  Patient has been counseled on low-fat low-cholesterol diet and to continue with the present dose of the Lipitor 10 mg at bedtime.  Patient last lipid profile did reveal an elevated LDL.     7.  Vitamin D deficiency.  Patient is on vitamin D supplement and has been followed by the primary care physician.     8.  Insulin requiring diabetes.  Patient has been counseled on [employer] diabetic [employer] diet.  Patient blood sugar has been followed by endocrinology.        9.  Chronic back pain status post lower back surgery.  Patient is on Norco and gabapentin.     10.  Episodes of snoring with no previous sleep study.  Patient has been recommended to undergo sleep study to rule out obstructive sleep apnea.     11.  Obesity with a body mass index of 30.  Patient has been counseled on weight reduction lifestyle modification and dietary restriction.  Patient has been explained the risk associated with obesity in the current and progression of atherosclerotic coronary artery disease and peripheral vascular disease.     12.  Diabetic neuropathy.  Patient is on gabapentin and has been followed by the primary care physician.        Above plan of management were discussed with the patient      Time: time spent in face-to-face evaluation of greater than 55  minutes and interacting and formulating examining and discussing the plan with the patient with 50% of greater time spent in face-to-face interaction.    Electronically signed by Tristan Everett MD, 02/10/23, 9:21 AM CST.      Dictated utilizing Dragon dictation.

## 2023-02-10 NOTE — PROGRESS NOTES
Patient underwent a myocardial perfusion Lexiscan Cardiolite stress test.  Patient myocardial perfusion Lexiscan Cardiolite stress test was suggestive of reversible ischemia.  Patient was recommended coronary angiogram.     Patient has no documented history of atherosclerotic coronary artery disease with symptoms of substernal chest pain suggestive of crescendo unstable angina pectoris.  Patient has been explained risk-benefit treatment option for a coronary angiogram and an informed consent was obtained from the patient for the coronary angiogram.  Patient's Mallampati score was II, patient's ASA classification was II.  Patient will undergo IV hydration and will check the renal function prior to the coronary angiogram.

## 2023-02-13 ENCOUNTER — OFFICE VISIT (OUTPATIENT)
Dept: ENDOCRINOLOGY | Facility: CLINIC | Age: 73
End: 2023-02-13
Payer: MEDICARE

## 2023-02-13 ENCOUNTER — DOCUMENTATION (OUTPATIENT)
Dept: ENDOCRINOLOGY | Facility: CLINIC | Age: 73
End: 2023-02-13
Payer: COMMERCIAL

## 2023-02-13 VITALS
BODY MASS INDEX: 32.71 KG/M2 | DIASTOLIC BLOOD PRESSURE: 78 MMHG | OXYGEN SATURATION: 98 % | SYSTOLIC BLOOD PRESSURE: 150 MMHG | HEART RATE: 73 BPM | HEIGHT: 66 IN | WEIGHT: 203.5 LBS

## 2023-02-13 DIAGNOSIS — E78.2 MIXED HYPERLIPIDEMIA: ICD-10-CM

## 2023-02-13 DIAGNOSIS — Z79.4 TYPE 2 DIABETES MELLITUS WITH HYPERGLYCEMIA, WITH LONG-TERM CURRENT USE OF INSULIN: Primary | ICD-10-CM

## 2023-02-13 DIAGNOSIS — I10 ESSENTIAL HYPERTENSION: ICD-10-CM

## 2023-02-13 DIAGNOSIS — E11.65 TYPE 2 DIABETES MELLITUS WITH HYPERGLYCEMIA, WITH LONG-TERM CURRENT USE OF INSULIN: Primary | ICD-10-CM

## 2023-02-13 DIAGNOSIS — E55.9 VITAMIN D DEFICIENCY: ICD-10-CM

## 2023-02-13 DIAGNOSIS — E11.42 DIABETIC POLYNEUROPATHY ASSOCIATED WITH TYPE 2 DIABETES MELLITUS: ICD-10-CM

## 2023-02-13 PROCEDURE — 99214 OFFICE O/P EST MOD 30 MIN: CPT | Performed by: NURSE PRACTITIONER

## 2023-02-13 NOTE — PROGRESS NOTES
"Chief Complaint  Diabetes    Subjective          Lizbeth Hurd presents to [facility] ENDOCRINOLOGY  Diabetes         In office visit       Primary provider JACOB Shaw     72 year old female presents for follow up      Reason diabetes mellitus type 2     Diagnosed in 2008      Timing constant     Quality improved      Severity moderate       Microvascular -- neuropathy, no DR      History of left renal cancer         Diabetes regimen      Oral medications, insulin            Blood Glucose Readings     Fingerstick's      Checking 4  times daily        At goal    Am under 130     Daytime up to 179           She had a heart cath which showed no blockages     Review of Systems - General ROS: negative        Objective   Vital Signs:   /78   Pulse 73   Ht 167.6 cm (66\")   Wt 92.3 kg (203 lb 8 oz)   SpO2 98%   BMI 32.85 kg/m²     Physical Exam  Constitutional:       Appearance: Normal appearance.   Cardiovascular:      Rate and Rhythm: Regular rhythm.      Heart sounds: Normal heart sounds.   Pulmonary:      Breath sounds: Normal breath sounds.   Musculoskeletal:         General: Normal range of motion.      Cervical back: Normal range of motion.   Neurological:      Mental Status: She is alert.        Result Review :   The following data was reviewed by: JACOB Shaw on 04/14/2022:  Common labs    Common Labs 10/25/22 10/25/22 10/25/22 10/25/22 10/25/22 2/6/23 2/6/23 2/6/23 2/6/23 2/6/23 2/10/23 2/10/23    0859 0859 0859 0859 0942 1041 1041 1041 1041 1041 0659 0659   Glucose  145 (A)     116 (A)     280 (A)   BUN  19     18     19   Creatinine  0.77     0.76     0.79   Sodium  143     140     138   Potassium  3.7     3.6     4.2   Chloride  102     102     101   Calcium  9.8     9.5     10.1   Albumin  4.40     4.2        Total Bilirubin  0.8     0.9        Alkaline Phosphatase  46     44        AST (SGOT)  31     30        ALT (SGPT)  30     27      "   WBC 8.52     7.74     10.38    Hemoglobin 12.9     12.9     13.3    Hematocrit 38.5     38.9     39.2    Platelets 125 (A)     137 (A)     125 (A)    Total Cholesterol   127     117       Triglycerides   189 (A)     162 (A)       HDL Cholesterol   35 (A)     31 (A)       LDL Cholesterol    60     58       Hemoglobin A1C    7.00 (A)      7.10 (A)     Microalbumin, Urine     5.6    3.8      (A) Abnormal value                        Assessment and Plan    Diagnoses and all orders for this visit:    1. Type 2 diabetes mellitus with hyperglycemia, with long-term current use of insulin (HCC) (Primary)  -     CBC & Differential; Future  -     Comprehensive Metabolic Panel; Future  -     Hemoglobin A1c; Future  -     Lipid Panel; Future  -     Microalbumin / Creatinine Urine Ratio - Urine, Clean Catch; Future  -     TSH; Future  -     Vitamin D,25-Hydroxy; Future  -     Vitamin B12; Future    2. Mixed hyperlipidemia  -     CBC & Differential; Future  -     Comprehensive Metabolic Panel; Future  -     Hemoglobin A1c; Future  -     Lipid Panel; Future  -     Microalbumin / Creatinine Urine Ratio - Urine, Clean Catch; Future  -     TSH; Future  -     Vitamin D,25-Hydroxy; Future  -     Vitamin B12; Future    3. Essential hypertension  -     CBC & Differential; Future  -     Comprehensive Metabolic Panel; Future  -     Hemoglobin A1c; Future  -     Lipid Panel; Future  -     Microalbumin / Creatinine Urine Ratio - Urine, Clean Catch; Future  -     TSH; Future  -     Vitamin D,25-Hydroxy; Future  -     Vitamin B12; Future    4. Diabetic polyneuropathy associated with type 2 diabetes mellitus (HCC)  -     CBC & Differential; Future  -     Comprehensive Metabolic Panel; Future  -     Hemoglobin A1c; Future  -     Lipid Panel; Future  -     Microalbumin / Creatinine Urine Ratio - Urine, Clean Catch; Future  -     TSH; Future  -     Vitamin D,25-Hydroxy; Future  -     Vitamin B12; Future    5. Vitamin D deficiency  -     CBC &  Differential; Future  -     Comprehensive Metabolic Panel; Future  -     Hemoglobin A1c; Future  -     Lipid Panel; Future  -     Microalbumin / Creatinine Urine Ratio - Urine, Clean Catch; Future  -     TSH; Future  -     Vitamin D,25-Hydroxy; Future  -     Vitamin B12; Future            Glycemic Management     Diabetes mellitus type 2      Lab Results   Component Value Date    HGBA1C 7.10 (H) 02/06/2023        Taking Toujeo   55 units         If am sugar is less than 80 decrease by 5 units             Humalog as carb counting         Taking 15 up to 25 units TID      Plus sliding scale         2 per 50 above 150         Aim for 45 grams of carbohydrate per meal      Aim for 15 grams of carbohydrate per snack         Previous regimen            Metformin 850 mg one po BID ---stopped due side effects         amaryl 2 mg BID --- -stopped when starting insulin      Humalog sliding scale -stop          send for approval for mesfin 2         The patient has diabetes mellitus, insulin-dependent.     Our Diabetes Department has evaluated the patient in the last six months and will continue counseling on insulin adjustment.      The patient performs blood glucose testing at least four times daily with proven glucose variability from 50 to 300 mg per dl.     The patient is administering basal insulin and prandial insulin four times per day for more than six months.     The patient uses a home blood glucose monitor to assess blood glucose at least four times daily for more than six months.     The patient requires frequent adjustment of insulin treatment regimen based on blood glucose readings.     The patient has frequent variability in blood glucose readings due to activity and variability in meal content and time.      The patient has completed a diabetes education program with us.     The patient has demonstrated the ability to self-monitor her glucose.      The patient is motivated in improving diabetes control      The  patient has hypoglycemia unawareness             Lipid Management              Taking niacin         Total Cholesterol   Date Value Ref Range Status   02/06/2023 117 0 - 200 mg/dL Final     Triglycerides   Date Value Ref Range Status   02/06/2023 162 (H) 0 - 150 mg/dL Final     HDL Cholesterol   Date Value Ref Range Status   02/06/2023 31 (L) 40 - 60 mg/dL Final     LDL Cholesterol    Date Value Ref Range Status   02/06/2023 58 0 - 100 mg/dL Final           Blood Pressure Management           Taking Lisinopril 20 mg daily     Taking norvasc 5 mg daily --- stopped due to swelling                     Microvascular Complication Monitoring         Eye exam June 2022, no Dr     She had bilateral cataract surgery      Neuropathy      Taking gapapentin        Component      Latest Ref Rng & Units 10/25/2022   Microalbumin/Creatinine Ratio      mg/g 38.1   Creatinine, Urine      mg/dL 146.8   Microalbumin, Urine      mg/dL 5.6                 Taking Cymbalta  60 mg            Bone Health        Vitamin d def.     Taking vitamin d supplement -    Taking 50,000 units daily             Other Diabetes Related Aspects              Thyroid Health        Lab Results   Component Value Date    TSH 2.060 02/06/2023          Lab Results   Component Value Date    [id number] 02/06/2023                        Follow Up   Return in about 3 months (around 5/13/2023).  Patient was given instructions and counseling regarding her condition or for health maintenance advice. Please see specific information pulled into the AVS if appropriate.         This document has been electronically signed by JACOB Shaw on February 13, 2023 15:10 CST.

## 2023-02-15 ENCOUNTER — DOCUMENTATION (OUTPATIENT)
Dept: CARDIAC REHAB | Facility: HOSPITAL | Age: 73
End: 2023-02-15
Payer: COMMERCIAL

## 2023-04-24 ENCOUNTER — TELEPHONE (OUTPATIENT)
Dept: ENDOCRINOLOGY | Facility: CLINIC | Age: 73
End: 2023-04-24
Payer: COMMERCIAL

## 2023-04-24 DIAGNOSIS — Z79.4 TYPE 2 DIABETES MELLITUS WITH HYPERGLYCEMIA, WITH LONG-TERM CURRENT USE OF INSULIN: Primary | ICD-10-CM

## 2023-04-24 DIAGNOSIS — E11.65 TYPE 2 DIABETES MELLITUS WITH HYPERGLYCEMIA, WITH LONG-TERM CURRENT USE OF INSULIN: ICD-10-CM

## 2023-04-24 DIAGNOSIS — E11.65 TYPE 2 DIABETES MELLITUS WITH HYPERGLYCEMIA, WITH LONG-TERM CURRENT USE OF INSULIN: Primary | ICD-10-CM

## 2023-04-24 DIAGNOSIS — Z79.4 TYPE 2 DIABETES MELLITUS WITH HYPERGLYCEMIA, WITH LONG-TERM CURRENT USE OF INSULIN: ICD-10-CM

## 2023-04-24 RX ORDER — FLURBIPROFEN SODIUM 0.3 MG/ML
SOLUTION/ DROPS OPHTHALMIC
Qty: 200 EACH | Refills: 11 | Status: SHIPPED | OUTPATIENT
Start: 2023-04-24

## 2023-04-24 RX ORDER — DULOXETIN HYDROCHLORIDE 60 MG/1
60 CAPSULE, DELAYED RELEASE ORAL DAILY
Qty: 30 CAPSULE | Refills: 11 | Status: SHIPPED | OUTPATIENT
Start: 2023-04-24 | End: 2023-04-24 | Stop reason: SDUPTHER

## 2023-04-24 RX ORDER — DULOXETIN HYDROCHLORIDE 60 MG/1
60 CAPSULE, DELAYED RELEASE ORAL DAILY
Qty: 30 CAPSULE | Refills: 11 | Status: SHIPPED | OUTPATIENT
Start: 2023-04-24 | End: 2024-04-23

## 2023-04-24 RX ORDER — INSULIN LISPRO 100 [IU]/ML
INJECTION, SOLUTION INTRAVENOUS; SUBCUTANEOUS
Qty: 100 ML | Refills: 11 | Status: SHIPPED | OUTPATIENT
Start: 2023-04-24

## 2023-04-24 NOTE — TELEPHONE ENCOUNTER
Pt called and stated that [facility] has bought out her pharmacy, [facility] that she was using and is needing for Dick to send all new prescriptions for her needles, humalog, cymbalta and [facility] [address] [facility]. Pt was advised that the pharmacy should send us that request. She said she has been on the phone with Joshua and they told her that she had to contact us before they will fill anything.     Please advise.

## 2023-04-24 NOTE — TELEPHONE ENCOUNTER
Pt called and stated that [facility] has bought out her pharmacy, [facility] that she was using and is needing for Dick to send all new prescriptions for her needles, humalog, cymbalta and [facility] [address] [facility]. Pt was advised that the pharmacy should send us that request. She said she has been on the phone with [facility] and they told her that she had to contact us before they will fill anything.    Please advise.    Thanks

## 2023-05-22 ENCOUNTER — TELEPHONE (OUTPATIENT)
Dept: ENDOCRINOLOGY | Facility: CLINIC | Age: 73
End: 2023-05-22
Payer: COMMERCIAL

## 2023-05-22 NOTE — TELEPHONE ENCOUNTER
Informed [facility] pharmacy that it is up to 40 units daily 120 units max per day

## 2023-05-22 NOTE — TELEPHONE ENCOUNTER
[facility] called requesting clarification on directions for the pt's Humalog quick pen.    Please advise.    Thanks

## 2023-05-24 ENCOUNTER — LAB (OUTPATIENT)
Dept: LAB | Facility: HOSPITAL | Age: 73
End: 2023-05-24
Payer: MEDICARE

## 2023-05-24 DIAGNOSIS — I10 ESSENTIAL HYPERTENSION: ICD-10-CM

## 2023-05-24 DIAGNOSIS — E11.42 DIABETIC POLYNEUROPATHY ASSOCIATED WITH TYPE 2 DIABETES MELLITUS: ICD-10-CM

## 2023-05-24 DIAGNOSIS — Z79.4 TYPE 2 DIABETES MELLITUS WITH HYPERGLYCEMIA, WITH LONG-TERM CURRENT USE OF INSULIN: ICD-10-CM

## 2023-05-24 DIAGNOSIS — E11.65 TYPE 2 DIABETES MELLITUS WITH HYPERGLYCEMIA, WITH LONG-TERM CURRENT USE OF INSULIN: ICD-10-CM

## 2023-05-24 DIAGNOSIS — E78.2 MIXED HYPERLIPIDEMIA: ICD-10-CM

## 2023-05-24 DIAGNOSIS — E55.9 VITAMIN D DEFICIENCY: ICD-10-CM

## 2023-05-24 LAB
25(OH)D3 SERPL-MCNC: 29.1 NG/ML (ref 30–100)
ALBUMIN SERPL-MCNC: 4.7 G/DL (ref 3.5–5.2)
ALBUMIN UR-MCNC: 6.3 MG/DL
ALBUMIN/GLOB SERPL: 1.3 G/DL
ALP SERPL-CCNC: 48 U/L (ref 39–117)
ALT SERPL W P-5'-P-CCNC: 32 U/L (ref 1–33)
ANION GAP SERPL CALCULATED.3IONS-SCNC: 11 MMOL/L (ref 5–15)
AST SERPL-CCNC: 35 U/L (ref 1–32)
BASOPHILS # BLD AUTO: 0.11 10*3/MM3 (ref 0–0.2)
BASOPHILS NFR BLD AUTO: 1 % (ref 0–1.5)
BILIRUB SERPL-MCNC: 0.8 MG/DL (ref 0–1.2)
BUN SERPL-MCNC: 23 MG/DL (ref 8–23)
BUN/CREAT SERPL: 28.4 (ref 7–25)
CALCIUM SPEC-SCNC: 9.7 MG/DL (ref 8.6–10.5)
CHLORIDE SERPL-SCNC: 100 MMOL/L (ref 98–107)
CHOLEST SERPL-MCNC: 133 MG/DL (ref 0–200)
CO2 SERPL-SCNC: 28 MMOL/L (ref 22–29)
CREAT SERPL-MCNC: 0.81 MG/DL (ref 0.57–1)
CREAT UR-MCNC: 186.6 MG/DL
DEPRECATED RDW RBC AUTO: 43.4 FL (ref 37–54)
EGFRCR SERPLBLD CKD-EPI 2021: 78.2 ML/MIN/1.73
EOSINOPHIL # BLD AUTO: 0.23 10*3/MM3 (ref 0–0.4)
EOSINOPHIL NFR BLD AUTO: 2.1 % (ref 0.3–6.2)
ERYTHROCYTE [DISTWIDTH] IN BLOOD BY AUTOMATED COUNT: 13.8 % (ref 12.3–15.4)
GLOBULIN UR ELPH-MCNC: 3.6 GM/DL
GLUCOSE SERPL-MCNC: 170 MG/DL (ref 65–99)
HBA1C MFR BLD: 7.4 % (ref 4.8–5.6)
HCT VFR BLD AUTO: 39.9 % (ref 34–46.6)
HDLC SERPL-MCNC: 33 MG/DL (ref 40–60)
HGB BLD-MCNC: 14 G/DL (ref 12–15.9)
IMM GRANULOCYTES # BLD AUTO: 0.03 10*3/MM3 (ref 0–0.05)
IMM GRANULOCYTES NFR BLD AUTO: 0.3 % (ref 0–0.5)
LDLC SERPL CALC-MCNC: 69 MG/DL (ref 0–100)
LDLC/HDLC SERPL: 1.94 {RATIO}
LYMPHOCYTES # BLD AUTO: 3.2 10*3/MM3 (ref 0.7–3.1)
LYMPHOCYTES NFR BLD AUTO: 29.2 % (ref 19.6–45.3)
MCH RBC QN AUTO: 30.4 PG (ref 26.6–33)
MCHC RBC AUTO-ENTMCNC: 35.1 G/DL (ref 31.5–35.7)
MCV RBC AUTO: 86.7 FL (ref 79–97)
MICROALBUMIN/CREAT UR: 33.8 MG/G
MONOCYTES # BLD AUTO: 1.15 10*3/MM3 (ref 0.1–0.9)
MONOCYTES NFR BLD AUTO: 10.5 % (ref 5–12)
NEUTROPHILS NFR BLD AUTO: 56.9 % (ref 42.7–76)
NEUTROPHILS NFR BLD AUTO: 6.25 10*3/MM3 (ref 1.7–7)
NRBC BLD AUTO-RTO: 0 /100 WBC (ref 0–0.2)
PLATELET # BLD AUTO: 148 10*3/MM3 (ref 140–450)
PMV BLD AUTO: 12 FL (ref 6–12)
POTASSIUM SERPL-SCNC: 4.3 MMOL/L (ref 3.5–5.2)
PROT SERPL-MCNC: 8.3 G/DL (ref 6–8.5)
RBC # BLD AUTO: 4.6 10*6/MM3 (ref 3.77–5.28)
SODIUM SERPL-SCNC: 139 MMOL/L (ref 136–145)
TRIGL SERPL-MCNC: 180 MG/DL (ref 0–150)
TSH SERPL DL<=0.05 MIU/L-ACNC: 1.71 UIU/ML (ref 0.27–4.2)
VIT B12 BLD-MCNC: 671 PG/ML (ref 211–946)
VLDLC SERPL-MCNC: 31 MG/DL (ref 5–40)
WBC NRBC COR # BLD: 10.97 10*3/MM3 (ref 3.4–10.8)

## 2023-05-24 PROCEDURE — 80061 LIPID PANEL: CPT

## 2023-05-24 PROCEDURE — 82306 VITAMIN D 25 HYDROXY: CPT

## 2023-05-24 PROCEDURE — 82570 ASSAY OF URINE CREATININE: CPT

## 2023-05-24 PROCEDURE — 84443 ASSAY THYROID STIM HORMONE: CPT

## 2023-05-24 PROCEDURE — 82043 UR ALBUMIN QUANTITATIVE: CPT

## 2023-05-24 PROCEDURE — 85025 COMPLETE CBC W/AUTO DIFF WBC: CPT

## 2023-05-24 PROCEDURE — 83036 HEMOGLOBIN GLYCOSYLATED A1C: CPT

## 2023-05-24 PROCEDURE — 80053 COMPREHEN METABOLIC PANEL: CPT

## 2023-05-24 PROCEDURE — 82607 VITAMIN B-12: CPT

## 2023-05-31 ENCOUNTER — OFFICE VISIT (OUTPATIENT)
Dept: ENDOCRINOLOGY | Facility: CLINIC | Age: 73
End: 2023-05-31

## 2023-05-31 VITALS
BODY MASS INDEX: 32.59 KG/M2 | WEIGHT: 202.8 LBS | DIASTOLIC BLOOD PRESSURE: 66 MMHG | HEIGHT: 66 IN | HEART RATE: 87 BPM | SYSTOLIC BLOOD PRESSURE: 160 MMHG | OXYGEN SATURATION: 94 %

## 2023-05-31 DIAGNOSIS — Z79.4 TYPE 2 DIABETES MELLITUS WITH HYPERGLYCEMIA, WITH LONG-TERM CURRENT USE OF INSULIN: Primary | ICD-10-CM

## 2023-05-31 DIAGNOSIS — E55.9 VITAMIN D DEFICIENCY: ICD-10-CM

## 2023-05-31 DIAGNOSIS — E11.65 TYPE 2 DIABETES MELLITUS WITH HYPERGLYCEMIA, WITH LONG-TERM CURRENT USE OF INSULIN: Primary | ICD-10-CM

## 2023-05-31 DIAGNOSIS — I10 ESSENTIAL HYPERTENSION: ICD-10-CM

## 2023-05-31 DIAGNOSIS — E78.2 MIXED HYPERLIPIDEMIA: ICD-10-CM

## 2023-05-31 DIAGNOSIS — E11.42 DIABETIC POLYNEUROPATHY ASSOCIATED WITH TYPE 2 DIABETES MELLITUS: ICD-10-CM

## 2023-05-31 NOTE — PROGRESS NOTES
"Chief Complaint  Diabetes    Subjective          Lizbeth Hurd presents to [facility] ENDOCRINOLOGY  Diabetes         In office visit       Primary provider JACOB Shaw      73-year-old female presents for follow-up     Diabetes mellitus type 2      Duration  2008        timing  Constant         Quality improved      Severity moderate       Complications - neuropathy, no DR      History of left renal cancer         Diabetes regimen      Oral medications, insulin            Blood Glucose Readings     Fingerstick's      Checking 4  times daily      Using the mesfin     See below     This am was 130     She had a heart cath which showed no blockages       Review of Systems - General ROS: negative          Objective   Vital Signs:   /66   Pulse 87   Ht 167.6 cm (66\")   Wt 92 kg (202 lb 12.8 oz)   SpO2 94%   BMI 32.73 kg/m²     Physical Exam  Constitutional:       Appearance: Normal appearance.   Cardiovascular:      Rate and Rhythm: Regular rhythm.      Heart sounds: Normal heart sounds.   Pulmonary:      Breath sounds: Normal breath sounds.   Musculoskeletal:         General: Normal range of motion.      Cervical back: Normal range of motion.   Neurological:      Mental Status: She is alert.        Result Review :   The following data was reviewed by: JACOB Shaw on 04/14/2022:  Common labs        2/6/2023    10:41 2/10/2023    06:59 5/24/2023    09:04 5/24/2023    09:16   Common Labs   Glucose 116   280   170      BUN 18   19   23      Creatinine 0.76   0.79   0.81      Sodium 140   138   139      Potassium 3.6   4.2   4.3      Chloride 102   101   100      Calcium 9.5   10.1   9.7      Albumin 4.2    4.7      Total Bilirubin 0.9    0.8      Alkaline Phosphatase 44    48      AST (SGOT) 30    35      ALT (SGPT) 27    32      WBC 7.74   10.38    10.97     Hemoglobin 12.9   13.3    14.0     Hematocrit 38.9   39.2    39.9     Platelets 137   125    " 148     Total Cholesterol 117    133      Triglycerides 162    180      HDL Cholesterol 31    33      LDL Cholesterol  58    69      Hemoglobin A1C 7.10    7.40      Microalbumin, Urine 3.8    6.3                    Assessment and Plan    Diagnoses and all orders for this visit:    1. Type 2 diabetes mellitus with hyperglycemia, with long-term current use of insulin (HCC) (Primary)  -     CBC & Differential; Future  -     Comprehensive Metabolic Panel; Future  -     Hemoglobin A1c; Future  -     Lipid Panel; Future  -     Microalbumin / Creatinine Urine Ratio - Urine, Clean Catch; Future  -     TSH; Future  -     Vitamin B12; Future    2. Mixed hyperlipidemia  -     CBC & Differential; Future  -     Comprehensive Metabolic Panel; Future  -     Hemoglobin A1c; Future  -     Lipid Panel; Future  -     Microalbumin / Creatinine Urine Ratio - Urine, Clean Catch; Future  -     TSH; Future  -     Vitamin B12; Future    3. Essential hypertension  -     CBC & Differential; Future  -     Comprehensive Metabolic Panel; Future  -     Hemoglobin A1c; Future  -     Lipid Panel; Future  -     Microalbumin / Creatinine Urine Ratio - Urine, Clean Catch; Future  -     TSH; Future  -     Vitamin B12; Future    4. Diabetic polyneuropathy associated with type 2 diabetes mellitus  -     CBC & Differential; Future  -     Comprehensive Metabolic Panel; Future  -     Hemoglobin A1c; Future  -     Lipid Panel; Future  -     Microalbumin / Creatinine Urine Ratio - Urine, Clean Catch; Future  -     TSH; Future  -     Vitamin B12; Future    5. Vitamin D deficiency  -     CBC & Differential; Future  -     Comprehensive Metabolic Panel; Future  -     Hemoglobin A1c; Future  -     Lipid Panel; Future  -     Microalbumin / Creatinine Urine Ratio - Urine, Clean Catch; Future  -     TSH; Future  -     Vitamin B12; Future            Glycemic Management     Diabetes mellitus type 2      Lab Results   Component Value Date    HGBA1C 7.40 (H) 05/24/2023          [facility] personal system    Ambulatory Glucose Profile Report    Days Analyzed : 2 week period ending on 05/31/23      Continuous Glucose Monitory Device:  FreeStyle [place] 2     - 62% very high target range  - 28% high target range  - 10% in target range  - 0% below target range  - GMI 9.9 %  - Average glucose 274 mg/dl    Interpretation : Diabetes Type 2 Uncontrolled             She states has been out of medication     Just restarted this week ---     She is eating high carb     Adjust mealtime insulin for higher carbs            Taking Toujeo   55 units         If am sugar is less than 80 decrease by 5 units             Humalog as carb counting         Taking 15 up to 25 units TID --- increase to 30 units      Plus sliding scale         2 per 50 above 150         Aim for 45 grams of carbohydrate per meal      Aim for 15 grams of carbohydrate per snack         Previous regimen            Metformin 850 mg one po BID ---stopped due side effects         amaryl 2 mg BID --- -stopped when starting insulin      Humalog sliding scale -stop                      Lipid Management              Taking niacin         Total Cholesterol   Date Value Ref Range Status   05/24/2023 133 0 - 200 mg/dL Final     Triglycerides   Date Value Ref Range Status   05/24/2023 180 (H) 0 - 150 mg/dL Final     HDL Cholesterol   Date Value Ref Range Status   05/24/2023 33 (L) 40 - 60 mg/dL Final     LDL Cholesterol    Date Value Ref Range Status   05/24/2023 69 0 - 100 mg/dL Final           Blood Pressure Management           Taking Lisinopril 20 mg daily     Taking norvasc 5 mg daily --- stopped due to swelling                     Microvascular Complication Monitoring         Eye exam June 2022, no Dr     She had bilateral cataract surgery      Neuropathy      Taking gapapentin          Component      Latest Ref Rng 5/24/2023   Microalbumin/Creatinine Ratio      mg/g 33.8    Creatinine, Urine      mg/dL 186.6    Microalbumin, Urine       mg/dL 6.3                Taking Cymbalta  60 mg            Bone Health        Vitamin d def.     Taking vitamin d supplement -    Taking 50,000 units daily             Other Diabetes Related Aspects              Thyroid Health        Lab Results   Component Value Date    TSH 1.710 05/24/2023          Lab Results   Component Value Date    [id number] 05/24/2023                        Follow Up   Return in about 3 months (around 8/31/2023) for Recheck, labs one week prior to next appointment.  Patient was given instructions and counseling regarding her condition or for health maintenance advice. Please see specific information pulled into the AVS if appropriate.         This document has been electronically signed by JACOB Shaw on May 31, 2023 13:54 CDT.

## 2023-09-06 ENCOUNTER — LAB (OUTPATIENT)
Dept: LAB | Facility: HOSPITAL | Age: 73
End: 2023-09-06
Payer: MEDICARE

## 2023-09-06 DIAGNOSIS — E55.9 VITAMIN D DEFICIENCY: ICD-10-CM

## 2023-09-06 DIAGNOSIS — Z79.4 TYPE 2 DIABETES MELLITUS WITH HYPERGLYCEMIA, WITH LONG-TERM CURRENT USE OF INSULIN: ICD-10-CM

## 2023-09-06 DIAGNOSIS — E11.42 DIABETIC POLYNEUROPATHY ASSOCIATED WITH TYPE 2 DIABETES MELLITUS: ICD-10-CM

## 2023-09-06 DIAGNOSIS — I10 ESSENTIAL HYPERTENSION: ICD-10-CM

## 2023-09-06 DIAGNOSIS — E11.65 TYPE 2 DIABETES MELLITUS WITH HYPERGLYCEMIA, WITH LONG-TERM CURRENT USE OF INSULIN: ICD-10-CM

## 2023-09-06 DIAGNOSIS — E78.2 MIXED HYPERLIPIDEMIA: ICD-10-CM

## 2023-09-06 LAB
ALBUMIN SERPL-MCNC: 4.5 G/DL (ref 3.5–5.2)
ALBUMIN UR-MCNC: 7.7 MG/DL
ALBUMIN/GLOB SERPL: 1.4 G/DL
ALP SERPL-CCNC: 45 U/L (ref 39–117)
ALT SERPL W P-5'-P-CCNC: 23 U/L (ref 1–33)
ANION GAP SERPL CALCULATED.3IONS-SCNC: 11 MMOL/L (ref 5–15)
AST SERPL-CCNC: 26 U/L (ref 1–32)
BASOPHILS # BLD AUTO: 0.07 10*3/MM3 (ref 0–0.2)
BASOPHILS NFR BLD AUTO: 0.9 % (ref 0–1.5)
BILIRUB SERPL-MCNC: 0.8 MG/DL (ref 0–1.2)
BUN SERPL-MCNC: 20 MG/DL (ref 8–23)
BUN/CREAT SERPL: 22.7 (ref 7–25)
CALCIUM SPEC-SCNC: 9.5 MG/DL (ref 8.6–10.5)
CHLORIDE SERPL-SCNC: 103 MMOL/L (ref 98–107)
CHOLEST SERPL-MCNC: 125 MG/DL (ref 0–200)
CO2 SERPL-SCNC: 28 MMOL/L (ref 22–29)
CREAT SERPL-MCNC: 0.88 MG/DL (ref 0.57–1)
CREAT UR-MCNC: 172.4 MG/DL
DEPRECATED RDW RBC AUTO: 44.3 FL (ref 37–54)
EGFRCR SERPLBLD CKD-EPI 2021: 70.4 ML/MIN/1.73
EOSINOPHIL # BLD AUTO: 0.2 10*3/MM3 (ref 0–0.4)
EOSINOPHIL NFR BLD AUTO: 2.6 % (ref 0.3–6.2)
ERYTHROCYTE [DISTWIDTH] IN BLOOD BY AUTOMATED COUNT: 13.8 % (ref 12.3–15.4)
GLOBULIN UR ELPH-MCNC: 3.2 GM/DL
GLUCOSE SERPL-MCNC: 204 MG/DL (ref 65–99)
HBA1C MFR BLD: 6.9 % (ref 4.8–5.6)
HCT VFR BLD AUTO: 38.5 % (ref 34–46.6)
HDLC SERPL-MCNC: 37 MG/DL (ref 40–60)
HGB BLD-MCNC: 13.1 G/DL (ref 12–15.9)
IMM GRANULOCYTES # BLD AUTO: 0.03 10*3/MM3 (ref 0–0.05)
IMM GRANULOCYTES NFR BLD AUTO: 0.4 % (ref 0–0.5)
LDLC SERPL CALC-MCNC: 59 MG/DL (ref 0–100)
LDLC/HDLC SERPL: 1.46 {RATIO}
LYMPHOCYTES # BLD AUTO: 2.05 10*3/MM3 (ref 0.7–3.1)
LYMPHOCYTES NFR BLD AUTO: 26.2 % (ref 19.6–45.3)
MCH RBC QN AUTO: 30 PG (ref 26.6–33)
MCHC RBC AUTO-ENTMCNC: 34 G/DL (ref 31.5–35.7)
MCV RBC AUTO: 88.3 FL (ref 79–97)
MICROALBUMIN/CREAT UR: 44.7 MG/G
MONOCYTES # BLD AUTO: 0.61 10*3/MM3 (ref 0.1–0.9)
MONOCYTES NFR BLD AUTO: 7.8 % (ref 5–12)
NEUTROPHILS NFR BLD AUTO: 4.86 10*3/MM3 (ref 1.7–7)
NEUTROPHILS NFR BLD AUTO: 62.1 % (ref 42.7–76)
NRBC BLD AUTO-RTO: 0 /100 WBC (ref 0–0.2)
PLATELET # BLD AUTO: 133 10*3/MM3 (ref 140–450)
PMV BLD AUTO: 12.9 FL (ref 6–12)
POTASSIUM SERPL-SCNC: 3.8 MMOL/L (ref 3.5–5.2)
PROT SERPL-MCNC: 7.7 G/DL (ref 6–8.5)
RBC # BLD AUTO: 4.36 10*6/MM3 (ref 3.77–5.28)
SODIUM SERPL-SCNC: 142 MMOL/L (ref 136–145)
TRIGL SERPL-MCNC: 170 MG/DL (ref 0–150)
TSH SERPL DL<=0.05 MIU/L-ACNC: 1.75 UIU/ML (ref 0.27–4.2)
VIT B12 BLD-MCNC: 566 PG/ML (ref 211–946)
VLDLC SERPL-MCNC: 29 MG/DL (ref 5–40)
WBC NRBC COR # BLD: 7.82 10*3/MM3 (ref 3.4–10.8)

## 2023-09-06 PROCEDURE — 82607 VITAMIN B-12: CPT

## 2023-09-06 PROCEDURE — 80061 LIPID PANEL: CPT

## 2023-09-06 PROCEDURE — 83036 HEMOGLOBIN GLYCOSYLATED A1C: CPT

## 2023-09-06 PROCEDURE — 84443 ASSAY THYROID STIM HORMONE: CPT

## 2023-09-06 PROCEDURE — 80053 COMPREHEN METABOLIC PANEL: CPT

## 2023-09-06 PROCEDURE — 82043 UR ALBUMIN QUANTITATIVE: CPT

## 2023-09-06 PROCEDURE — 82570 ASSAY OF URINE CREATININE: CPT

## 2023-09-06 PROCEDURE — 85025 COMPLETE CBC W/AUTO DIFF WBC: CPT

## 2023-09-12 ENCOUNTER — DOCUMENTATION (OUTPATIENT)
Dept: ENDOCRINOLOGY | Facility: CLINIC | Age: 73
End: 2023-09-12
Payer: COMMERCIAL

## 2023-09-12 ENCOUNTER — OFFICE VISIT (OUTPATIENT)
Dept: ENDOCRINOLOGY | Facility: CLINIC | Age: 73
End: 2023-09-12
Payer: MEDICARE

## 2023-09-12 VITALS
HEART RATE: 71 BPM | DIASTOLIC BLOOD PRESSURE: 74 MMHG | WEIGHT: 199.7 LBS | OXYGEN SATURATION: 94 % | HEIGHT: 66 IN | SYSTOLIC BLOOD PRESSURE: 110 MMHG | BODY MASS INDEX: 32.09 KG/M2

## 2023-09-12 DIAGNOSIS — E11.42 DIABETIC POLYNEUROPATHY ASSOCIATED WITH TYPE 2 DIABETES MELLITUS: ICD-10-CM

## 2023-09-12 DIAGNOSIS — E11.65 TYPE 2 DIABETES MELLITUS WITH HYPERGLYCEMIA, WITH LONG-TERM CURRENT USE OF INSULIN: ICD-10-CM

## 2023-09-12 DIAGNOSIS — Z79.4 TYPE 2 DIABETES MELLITUS WITH HYPERGLYCEMIA, WITH LONG-TERM CURRENT USE OF INSULIN: ICD-10-CM

## 2023-09-12 DIAGNOSIS — E78.2 MIXED HYPERLIPIDEMIA: Primary | ICD-10-CM

## 2023-09-12 DIAGNOSIS — I10 ESSENTIAL HYPERTENSION: ICD-10-CM

## 2023-09-12 DIAGNOSIS — E55.9 VITAMIN D DEFICIENCY: ICD-10-CM

## 2023-09-12 PROCEDURE — 3078F DIAST BP <80 MM HG: CPT | Performed by: NURSE PRACTITIONER

## 2023-09-12 PROCEDURE — 99214 OFFICE O/P EST MOD 30 MIN: CPT | Performed by: NURSE PRACTITIONER

## 2023-09-12 PROCEDURE — 95251 CONT GLUC MNTR ANALYSIS I&R: CPT | Performed by: NURSE PRACTITIONER

## 2023-09-12 PROCEDURE — 3074F SYST BP LT 130 MM HG: CPT | Performed by: NURSE PRACTITIONER

## 2023-09-12 PROCEDURE — 3044F HG A1C LEVEL LT 7.0%: CPT | Performed by: NURSE PRACTITIONER

## 2023-09-12 RX ORDER — ERGOCALCIFEROL 1.25 MG/1
50000 CAPSULE ORAL
Qty: 5 CAPSULE | Refills: 11 | Status: SHIPPED | OUTPATIENT
Start: 2023-09-12

## 2023-09-12 RX ORDER — DULOXETIN HYDROCHLORIDE 60 MG/1
60 CAPSULE, DELAYED RELEASE ORAL DAILY
Qty: 30 CAPSULE | Refills: 11 | Status: SHIPPED | OUTPATIENT
Start: 2023-09-12 | End: 2024-09-11

## 2023-09-12 RX ORDER — INSULIN LISPRO 100 [IU]/ML
INJECTION, SOLUTION INTRAVENOUS; SUBCUTANEOUS
Qty: 100 ML | Refills: 11 | Status: SHIPPED | OUTPATIENT
Start: 2023-09-12

## 2023-09-12 NOTE — PROGRESS NOTES
"Chief Complaint  Diabetes (mesfin)    Subjective          Lizbeth Hurd presents to [facility] ENDOCRINOLOGY  Diabetes       In office visit       Primary provider JACOB Shaw      73-year-old female presents for follow-up    Reason diabetes mellitus type 2     Diagnosed in 2008     Timing constant     Quality improved     Severity moderate       Complications - neuropathy, no DR      History of left renal cancer         Diabetes regimen      Oral medications, insulin            Blood Glucose Readings     Fingerstick's      Checking 4  times daily      Using the mesfin     See below         She had a heart cath which showed no blockages       Review of Systems - General ROS: negative          Objective   Vital Signs:   /74   Pulse 71   Ht 167.6 cm (66\")   Wt 90.6 kg (199 lb 11.2 oz)   SpO2 94%   BMI 32.23 kg/m²     Physical Exam  Constitutional:       Appearance: Normal appearance.   Cardiovascular:      Rate and Rhythm: Regular rhythm.      Heart sounds: Normal heart sounds.   Pulmonary:      Breath sounds: Normal breath sounds.   Musculoskeletal:         General: Normal range of motion.      Cervical back: Normal range of motion.   Neurological:      Mental Status: She is alert.      Result Review :   The following data was reviewed by: JACOB Shaw on 04/14/2022:  Common labs          2/10/2023    06:59 5/24/2023    09:04 5/24/2023    09:16 9/6/2023    10:46   Common Labs   Glucose 280  170   204    BUN 19  23   20    Creatinine 0.79  0.81   0.88    Sodium 138  139   142    Potassium 4.2  4.3   3.8    Chloride 101  100   103    Calcium 10.1  9.7   9.5    Albumin  4.7   4.5    Total Bilirubin  0.8   0.8    Alkaline Phosphatase  48   45    AST (SGOT)  35   26    ALT (SGPT)  32   23    WBC 10.38   10.97  7.82    Hemoglobin 13.3   14.0  13.1    Hematocrit 39.2   39.9  38.5    Platelets 125   148  133    Total Cholesterol  133   125    Triglycerides  " 180   170    HDL Cholesterol  33   37    LDL Cholesterol   69   59    Hemoglobin A1C  7.40   6.90    Microalbumin, Urine  6.3   7.7                Assessment and Plan    Diagnoses and all orders for this visit:    1. Mixed hyperlipidemia (Primary)  -     CBC & Differential; Future  -     Comprehensive Metabolic Panel; Future  -     Hemoglobin A1c; Future  -     Lipid Panel; Future  -     Microalbumin / Creatinine Urine Ratio - Urine, Clean Catch; Future  -     TSH; Future  -     Vitamin B12; Future  -     Vitamin D,25-Hydroxy; Future    2. Type 2 diabetes mellitus with hyperglycemia, with long-term current use of insulin  -     DULoxetine (Cymbalta) 60 MG capsule; Take 1 capsule by mouth Daily.  Dispense: 30 capsule; Refill: 11  -     Insulin Glargine, 1 Unit Dial, (TOUJEO) 300 UNIT/ML solution pen-injector injection; Inject 70 Units under the skin into the appropriate area as directed Every Night.  Dispense: 200 mL; Refill: 11  -     Insulin Lispro, 1 Unit Dial, (HumaLOG KwikPen) 100 UNIT/ML solution pen-injector; 20  Up to 40  units with meals  Dispense: 100 mL; Refill: 11  -     CBC & Differential; Future  -     Comprehensive Metabolic Panel; Future  -     Hemoglobin A1c; Future  -     Lipid Panel; Future  -     Microalbumin / Creatinine Urine Ratio - Urine, Clean Catch; Future  -     TSH; Future  -     Vitamin B12; Future  -     Vitamin D,25-Hydroxy; Future    3. Essential hypertension  -     CBC & Differential; Future  -     Comprehensive Metabolic Panel; Future  -     Hemoglobin A1c; Future  -     Lipid Panel; Future  -     Microalbumin / Creatinine Urine Ratio - Urine, Clean Catch; Future  -     TSH; Future  -     Vitamin B12; Future  -     Vitamin D,25-Hydroxy; Future    4. Diabetic polyneuropathy associated with type 2 diabetes mellitus  -     CBC & Differential; Future  -     Comprehensive Metabolic Panel; Future  -     Hemoglobin A1c; Future  -     Lipid Panel; Future  -     Microalbumin / Creatinine  Urine Ratio - Urine, Clean Catch; Future  -     TSH; Future  -     Vitamin B12; Future  -     Vitamin D,25-Hydroxy; Future    5. Vitamin D deficiency  -     CBC & Differential; Future  -     Comprehensive Metabolic Panel; Future  -     Hemoglobin A1c; Future  -     Lipid Panel; Future  -     Microalbumin / Creatinine Urine Ratio - Urine, Clean Catch; Future  -     TSH; Future  -     Vitamin B12; Future  -     Vitamin D,25-Hydroxy; Future    Other orders  -     vitamin D (ERGOCALCIFEROL) 1.25 MG (69243 UT) capsule capsule; Take 1 capsule by mouth Every 7 (Seven) Days.  Dispense: 5 capsule; Refill: 11              Glycemic Management     Diabetes mellitus type 2      Lab Results   Component Value Date    HGBA1C 6.90 (H) 09/06/2023         [facility] personal system      Ambulatory Glucose Profile Report    Days Analyzed : 2 week period ending on 09/12/23      Continuous Glucose Monitory Device:  FreeStyle [place] 2     47% very high target range  37% high target range  16% in target range  0% below target range  GMI 9.2 %  Average glucose 247 mg/dl    Interpretation : Diabetes Type 2 Uncontrolled       She has been high all the time     She has been eating high carb     High all the time even when not eating       Increase mealtime insulin       Increase basal      Taking Toujeo  50 untis increase to  55 units         If am sugar is less than 80 decrease by 5 units             Humalog as carb counting         Taking 15 up to 19  units TID--- increase up to 22 units      Plus sliding scale         2 per 50 above 150         Aim for 45 grams of carbohydrate per meal      Aim for 15 grams of carbohydrate per snack         Previous regimen            Metformin 850 mg one po BID ---stopped due side effects         amaryl 2 mg BID --- -stopped when starting insulin      Humalog sliding scale -stop                      Lipid Management              Taking niacin         Total Cholesterol   Date Value Ref Range Status   09/06/2023  125 0 - 200 mg/dL Final     Triglycerides   Date Value Ref Range Status   09/06/2023 170 (H) 0 - 150 mg/dL Final     HDL Cholesterol   Date Value Ref Range Status   09/06/2023 37 (L) 40 - 60 mg/dL Final     LDL Cholesterol    Date Value Ref Range Status   09/06/2023 59 0 - 100 mg/dL Final           Blood Pressure Management           Taking Lisinopril 20 mg daily     Taking norvasc 5 mg daily --- stopped due to swelling                     Microvascular Complication Monitoring         Eye exam June 2022, no Dr     She had bilateral cataract surgery      Neuropathy      Taking gapapentin        Component      Latest Ref Rng 9/6/2023   Microalbumin/Creatinine Ratio      mg/g 44.7    Creatinine, Urine      mg/dL 172.4    Microalbumin, Urine      mg/dL 7.7                Taking Cymbalta  60 mg            Bone Health        Vitamin d def.     Taking vitamin d supplement -    Taking 50,000 units daily             Other Diabetes Related Aspects              Thyroid Health        Lab Results   Component Value Date    TSH 1.750 09/06/2023          Lab Results   Component Value Date    [id number] 09/06/2023                        Follow Up   No follow-ups on file.  Patient was given instructions and counseling regarding her condition or for health maintenance advice. Please see specific information pulled into the AVS if appropriate.         This document has been electronically signed by JACOB Shaw on September 12, 2023 13:56 CDT.

## (undated) DEVICE — ANGIO-SEAL VIP VASCULAR CLOSURE DEVICE: Brand: ANGIO-SEAL

## (undated) DEVICE — ST CVR PROB PULLUP ULTRASND 5X48IN

## (undated) DEVICE — PK CATH LAB 60

## (undated) DEVICE — MODEL BT2000 P/N 700287-012KIT CONTENTS: MANIFOLD WITH SALINE AND CONTRAST PORTS, SALINE TUBING WITH SPIKE AND HAND SYRINGE, TRANSDUCER: Brand: BT2000 AUTOMATED MANIFOLD KIT

## (undated) DEVICE — CATH DIAG EXPO M/ PK 6FR FL4/FR4 PIG 3PK

## (undated) DEVICE — KT INTRO MINISTICK MAX W/GW NITNL/TUNG ECHO 4F 21G 7CM

## (undated) DEVICE — GW PERIPH GUIDERIGHT STD/J/TP PTFE/PCOAT SS 0.038IN 5X150CM

## (undated) DEVICE — INTRO SHEATH ART/FEM ENGAGE .038 6F12CM

## (undated) DEVICE — ELECTRODE,RT,STRESS,FOAM,50PK: Brand: MEDLINE